# Patient Record
Sex: MALE | Race: WHITE | NOT HISPANIC OR LATINO | Employment: UNEMPLOYED | URBAN - METROPOLITAN AREA
[De-identification: names, ages, dates, MRNs, and addresses within clinical notes are randomized per-mention and may not be internally consistent; named-entity substitution may affect disease eponyms.]

---

## 2021-07-02 ENCOUNTER — TRANSFERRED RECORDS (OUTPATIENT)
Dept: HEALTH INFORMATION MANAGEMENT | Facility: CLINIC | Age: 36
End: 2021-07-02

## 2021-07-12 ENCOUNTER — TRANSFERRED RECORDS (OUTPATIENT)
Dept: HEALTH INFORMATION MANAGEMENT | Facility: CLINIC | Age: 36
End: 2021-07-12

## 2021-07-12 LAB — PHQ9 SCORE: 0

## 2021-11-12 ENCOUNTER — TRANSFERRED RECORDS (OUTPATIENT)
Dept: HEALTH INFORMATION MANAGEMENT | Facility: CLINIC | Age: 36
End: 2021-11-12

## 2022-03-03 ENCOUNTER — TRANSFERRED RECORDS (OUTPATIENT)
Dept: HEALTH INFORMATION MANAGEMENT | Facility: CLINIC | Age: 37
End: 2022-03-03

## 2022-07-20 ENCOUNTER — OFFICE VISIT (OUTPATIENT)
Dept: FAMILY MEDICINE | Facility: CLINIC | Age: 37
End: 2022-07-20
Payer: COMMERCIAL

## 2022-07-20 VITALS
SYSTOLIC BLOOD PRESSURE: 114 MMHG | BODY MASS INDEX: 35.05 KG/M2 | HEIGHT: 71 IN | DIASTOLIC BLOOD PRESSURE: 70 MMHG | WEIGHT: 250.38 LBS | HEART RATE: 83 BPM

## 2022-07-20 DIAGNOSIS — J45.30 MILD PERSISTENT ASTHMA, UNSPECIFIED WHETHER COMPLICATED: ICD-10-CM

## 2022-07-20 DIAGNOSIS — M25.531 PAIN IN BOTH WRISTS: ICD-10-CM

## 2022-07-20 DIAGNOSIS — G89.4 CHRONIC PAIN DISORDER: ICD-10-CM

## 2022-07-20 DIAGNOSIS — G89.29 CHRONIC BILATERAL LOW BACK PAIN WITH BILATERAL SCIATICA: ICD-10-CM

## 2022-07-20 DIAGNOSIS — M25.532 PAIN IN BOTH WRISTS: ICD-10-CM

## 2022-07-20 DIAGNOSIS — F90.2 ATTENTION DEFICIT HYPERACTIVITY DISORDER (ADHD), COMBINED TYPE: Primary | ICD-10-CM

## 2022-07-20 DIAGNOSIS — M54.41 CHRONIC BILATERAL LOW BACK PAIN WITH BILATERAL SCIATICA: ICD-10-CM

## 2022-07-20 DIAGNOSIS — M54.42 CHRONIC BILATERAL LOW BACK PAIN WITH BILATERAL SCIATICA: ICD-10-CM

## 2022-07-20 DIAGNOSIS — Z79.899 CONTROLLED SUBSTANCE AGREEMENT SIGNED: ICD-10-CM

## 2022-07-20 PROBLEM — F12.90 MARIJUANA USE, CONTINUOUS: Status: ACTIVE | Noted: 2021-05-04

## 2022-07-20 PROBLEM — F39 MOOD DISORDER (H): Status: ACTIVE | Noted: 2017-08-30

## 2022-07-20 PROBLEM — F39 MOOD DISORDER (H): Status: RESOLVED | Noted: 2017-08-30 | Resolved: 2022-07-20

## 2022-07-20 PROBLEM — J30.2 SEASONAL ALLERGIC RHINITIS, UNSPECIFIED TRIGGER: Status: ACTIVE | Noted: 2018-03-21

## 2022-07-20 PROBLEM — B35.3 TINEA PEDIS OF BOTH FEET: Status: ACTIVE | Noted: 2019-09-23

## 2022-07-20 PROBLEM — Z72.51 HIGH RISK SEXUAL BEHAVIOR: Status: ACTIVE | Noted: 2017-08-30

## 2022-07-20 PROBLEM — M25.50 MULTIPLE JOINT PAIN: Status: ACTIVE | Noted: 2021-05-04

## 2022-07-20 PROBLEM — B36.0 TINEA VERSICOLOR: Status: RESOLVED | Noted: 2019-09-23 | Resolved: 2022-07-20

## 2022-07-20 PROBLEM — Z72.51 HIGH RISK SEXUAL BEHAVIOR: Status: RESOLVED | Noted: 2017-08-30 | Resolved: 2022-07-20

## 2022-07-20 PROBLEM — B36.0 TINEA VERSICOLOR: Status: ACTIVE | Noted: 2019-09-23

## 2022-07-20 PROBLEM — B35.3 TINEA PEDIS OF BOTH FEET: Status: RESOLVED | Noted: 2019-09-23 | Resolved: 2022-07-20

## 2022-07-20 PROCEDURE — 99204 OFFICE O/P NEW MOD 45 MIN: CPT | Performed by: FAMILY MEDICINE

## 2022-07-20 RX ORDER — IMIQUIMOD 12.5 MG/.25G
1 CREAM TOPICAL
COMMUNITY
Start: 2021-05-19 | End: 2022-11-22

## 2022-07-20 RX ORDER — FLUTICASONE PROPIONATE AND SALMETEROL XINAFOATE 230; 21 UG/1; UG/1
AEROSOL, METERED RESPIRATORY (INHALATION)
COMMUNITY
Start: 2022-06-21 | End: 2022-07-20

## 2022-07-20 RX ORDER — DEXTROAMPHETAMINE SACCHARATE, AMPHETAMINE ASPARTATE, DEXTROAMPHETAMINE SULFATE AND AMPHETAMINE SULFATE 7.5; 7.5; 7.5; 7.5 MG/1; MG/1; MG/1; MG/1
TABLET ORAL
COMMUNITY
Start: 2021-01-19 | End: 2023-02-24

## 2022-07-20 RX ORDER — LORATADINE 10 MG/1
10 TABLET ORAL DAILY
COMMUNITY
Start: 2021-08-03

## 2022-07-20 RX ORDER — METHOCARBAMOL 750 MG/1
TABLET, FILM COATED ORAL
Qty: 60 TABLET | Refills: 1 | Status: SHIPPED | OUTPATIENT
Start: 2022-07-20 | End: 2022-09-16

## 2022-07-20 RX ORDER — METHOCARBAMOL 750 MG/1
TABLET, FILM COATED ORAL
COMMUNITY
Start: 2021-05-21 | End: 2022-07-20

## 2022-07-20 RX ORDER — ALBUTEROL SULFATE 90 UG/1
AEROSOL, METERED RESPIRATORY (INHALATION)
Qty: 18 G | Refills: 2 | Status: SHIPPED | OUTPATIENT
Start: 2022-07-20 | End: 2022-09-16

## 2022-07-20 RX ORDER — CLINDAMYCIN AND BENZOYL PEROXIDE 10; 50 MG/G; MG/G
GEL TOPICAL
COMMUNITY
Start: 2021-08-03 | End: 2022-09-16

## 2022-07-20 RX ORDER — FLUTICASONE PROPIONATE AND SALMETEROL XINAFOATE 230; 21 UG/1; UG/1
AEROSOL, METERED RESPIRATORY (INHALATION)
Qty: 36 G | Refills: 3 | Status: SHIPPED | OUTPATIENT
Start: 2022-07-20 | End: 2023-11-10

## 2022-07-20 RX ORDER — ALBUTEROL SULFATE 90 UG/1
AEROSOL, METERED RESPIRATORY (INHALATION)
COMMUNITY
Start: 2022-05-06 | End: 2022-07-20

## 2022-07-20 ASSESSMENT — ASTHMA QUESTIONNAIRES: ACT_TOTALSCORE: 21

## 2022-07-20 NOTE — PROGRESS NOTES
Assessment & Plan     Abraham new today to [facility]. Reviewed and reconciled chart.    1. Attention deficit hyperactivity disorder (ADHD), combined type  2. Controlled substance agreement signed 7/20/22  Attempting to sign BALTAZAR for prior evaluation.   If unable to find, patient will complete new evaluation, recommend mobiTeris due to access concerns locally.   CSA signed today in anticipation of initiation of stimulant. Previously responded well to short acting adderall, was taking 15mg BID.     3. Mild persistent asthma, unspecified whether complicated  - fluticasone-salmeterol (ADVAIR HFA) 230-21 MCG/ACT inhaler; Inhale 2 puffs BY MOUTH twice a day.  Dispense: 36 g; Refill: 3  - albuterol (PROAIR HFA/PROVENTIL HFA/VENTOLIN HFA) 108 (90 Base) MCG/ACT inhaler; INHALE 2 PUFFS BY MOUTH EVERY FOUR HOURS AS NEEDED  Dispense: 18 g; Refill: 2    Symptoms adequately controlled. Continue current management.     4. Chronic pain disorder  5. Chronic bilateral low back pain with bilateral sciatica  - methocarbamol (ROBAXIN) 750 MG tablet; TAKE 1 TABLET  BY MOUTH ONCE A DAY AS NEEDED. LAST REFILL, FOLLOW UP APPOINTMENT IS NEEDED  Dispense: 60 tablet; Refill: 1  - Spine  Referral; Future    Working with spine specialist for steroid injections. NSAIDS irritate stomach. Methocarbamol does help. Will continue.   Referral placed to our spine clinic as this is more convenient.     6. Pain in both wrists  - Orthopedic  Referral; Future     Wrist symptoms atypical for carpal tunnel or tendonitis. Avoiding NSAIDS due to gastritis concerns. Continue splints for immobilization. Recommend hand ortho evaluation.     Return in about 6 months (around 1/20/2023) for Physical Exam + follow up asthma.    Ny العراقي DO  [facility]    Jyoti Ramachandran is a 36 year old presenting for the following health issues:  Recheck Medication and Joint Pain      History of Present Illness  "      Reason for visit:  Wrist/joint issues  Symptom onset:  More than a month  Symptoms include:  Shooting pain from fingers to elbow, dull aches and pains throughout  Symptom intensity:  Severe  Symptom progression:  Staying the same  Had these symptoms before:  No  What makes it worse:  Not using my braces and socks on wrists  What makes it better:  Wrapping my wrists    He eats 2-3 servings of fruits and vegetables daily.He consumes 0 sweetened beverage(s) daily.He exercises with enough effort to increase his heart rate 20 to 29 minutes per day.  He exercises with enough effort to increase his heart rate 4 days per week.   He is taking medications regularly.    ADHD: Onset in childhood, tried Ritalin, had difficulty sleeping. Parents took off meds. Went back to school, was formally diagnosed again and restarted on medications. Restarted Adderall, was able to focus. Hasn't been taking for a while and thinks would be beneficial. Short acting, was taking 15mg BID.     ASTHMA: ACT 21/25  Well controlled, needs rescue during the day if forgets morning meds.     LOW BACK PAIN: steroid injections every 3-4 months - [facility]. Had previous imaging. Nerve ablation next option, wanting to avoid surgery.     JOINT PAIN: Wrist pain bilaterally, hurts to pick things up, wearing braces bilaterally.     HEALTH MAINTENANCE:   - Hep C: previously screened  - HIV: previously screened   - COVID: declines  - lipid: not fasting, will do at physical    - tetanus: had one done within the last 10 years in [place]?     Review of Systems   See HPI above.       Objective    /70 (BP Location: Left arm, Patient Position: Sitting, Cuff Size: Adult Regular)   Pulse 83   Ht 1.803 m (5' 11\")   Wt 113.6 kg (250 lb 6 oz)   BMI 34.92 kg/m    Body mass index is 34.92 kg/m .  Physical Exam   GENERAL: healthy, alert and no distress  NECK: no adenopathy, no asymmetry, masses, or scars and thyroid normal to palpation  RESP: lungs " clear to auscultation - no rales, rhonchi or wheezes  CV: regular rate and rhythm, normal S1 S2, no S3 or S4, no murmur, click or rub, no peripheral edema and peripheral pulses strong  ABDOMEN: soft, nontender, no hepatosplenomegaly, no masses and bowel sounds normal  MS: no gross musculoskeletal defects noted, no edema  PSYCH: mentation appears normal, affect normal/bright              .  ..

## 2022-07-20 NOTE — LETTER
[facility]  07/20/22  Patient: Duarte Cuellar  YOB: 1985  Medical Record Number: [id number]                                                                                  Non-Opioid Controlled Substance Agreement    This is an agreement between you and your provider regarding safe and appropriate use of controlled substances prescribed by your care team. Controlled substances are?medicines that can cause physical and mental dependence (abuse).     There are strict laws about having and using these medicines. We here at [facility] are  committed to working with you in your efforts to get better. To support you in this work, we'll help you schedule regular office appointments for medicine refills. If we must cancel or change your appointment for any reason, we'll make sure you have enough medicine to last until your next appointment.     As a Provider, I will:     Listen carefully to your concerns while treating you with respect.     Recommend a treatment plan that I believe is in your best interest and may involve therapies other than medicine.      Talk with you often about the possible benefits and the risk of harm of any medicine that we prescribe for you.    Assess the safety of this medicine and check how well it works.      Provide a plan on how to taper (discontinue or go off) using this medicine if the decision is made to stop its use.      ::  As a Patient, I understand controlled substances:       Are prescribed by my care provider to help me function or work and manage my condition(s).?    Are strong medicines and can cause serious side effects.       Need to be taken exactly as prescribed.?Combining controlled substances with certain medicines or chemicals (such as illegal drugs, alcohol, sedatives, sleeping pills, and benzodiazepines) can be dangerous or even fatal.? If I stop taking my medicines suddenly, I may have severe withdrawal symptoms.      The risks, benefits, and side effects of these medicine(s) were explained to me. I agree that:    1. I will take part in other treatments as advised by my care team. This may be psychiatry or counseling, physical therapy, behavioral therapy, group treatment or a referral to specialist.    2. I will keep all my appointments and understand this is part of the monitoring of controlled substances.?My care team may require an office visit for EVERY controlled substance refill. If I miss appointments or don t follow instructions, my care team may stop my medicine    3. I will take my medicines as prescribed. I will not change the dose or schedule unless my care team tells me to. There will be no refills if I run out early.      4. I may be asked to come to the clinic and complete a urine drug test or complete a pill count. If I don t give a urine sample or participate in a pill count, the care team may stop my medicine.    5. I will only receive controlled substance prescriptions from this clinic. If I am treated by another provider, I will tell them that I am taking controlled substances and that I have a treatment agreement with this provider. I will inform my [facility] care team within one business day if I am given a prescription for any controlled substance by another healthcare provider. My [facility] care team can contact other providers and pharmacists about my use of any medicines.    6. It is up to me to make sure that I don't run out of my medicines on weekends or holidays.?If my care team is willing to refill my prescription without a visit, I must request refills only during office hours. Refills may take up to 3 business days to process. I will use one pharmacy to fill all my controlled substance prescriptions. I will notify the clinic about any changes to my insurance or medicine availability.    7. I am responsible for my prescriptions. If the medicine/prescription is lost, stolen or  destroyed, it will not be replaced.?I also agree not to share controlled substance medicines with anyone.     8. I am aware I should not use any illegal or recreational drugs. I agree not to drink alcohol unless my care team says I can.     9. If I enroll in the [place] Medical Cannabis program, I will tell my care team before my next refill.    10. I will tell my care team right away if I become pregnant, have a new medical problem treated outside of my regular clinic, or have a change in my medicines.     11. I understand that this medicine can affect my thinking, judgment and reaction time.? Alcohol and drugs affect the brain and body, which can affect the safety of my driving. Being under the influence of alcohol or drugs can affect my decision-making, behaviors, personal safety and the safety of others. Driving while impaired (DWI) can occur if a person is driving, operating or in physical control of a car, motorcycle, boat, snowmobile, ATV, motorbike, off-road vehicle or any other motor vehicle ([place] Statute 169A.20). I understand the risk if I choose to drive or operate any vehicle or machinery.    I understand that if I do not follow any of the conditions above, my prescriptions or treatment may be stopped or changed.   I agree that my provider, clinic care team and pharmacy may work with any city, state or federal law enforcement agency that investigates the misuse, sale or other diversion of my controlled medicine. I will allow my provider to discuss my care with, or share a copy of, this agreement with any other treating provider, pharmacy or emergency room where I receive care.     I have read this agreement and have asked questions about anything I did not understand.    ________________________________________________________  Patient Signature - Duarte Cuellar     ___________________                   Date     ________________________________________________________  Provider Signature - Ny SNYDER  العراقي, DO       ___________________                   Date     ________________________________________________________  Witness Signature (required if provider not present while patient signing)          ___________________                   Date

## 2022-08-01 ENCOUNTER — TRANSFERRED RECORDS (OUTPATIENT)
Dept: HEALTH INFORMATION MANAGEMENT | Facility: CLINIC | Age: 37
End: 2022-08-01

## 2022-08-02 ENCOUNTER — OFFICE VISIT (OUTPATIENT)
Dept: PHYSICAL MEDICINE AND REHAB | Facility: CLINIC | Age: 37
End: 2022-08-02
Attending: FAMILY MEDICINE
Payer: COMMERCIAL

## 2022-08-02 VITALS
WEIGHT: 249.3 LBS | DIASTOLIC BLOOD PRESSURE: 74 MMHG | BODY MASS INDEX: 34.77 KG/M2 | SYSTOLIC BLOOD PRESSURE: 136 MMHG | HEART RATE: 80 BPM

## 2022-08-02 DIAGNOSIS — M54.42 CHRONIC BILATERAL LOW BACK PAIN WITH BILATERAL SCIATICA: Primary | ICD-10-CM

## 2022-08-02 DIAGNOSIS — M51.369 DEGENERATIVE DISC DISEASE, LUMBAR: ICD-10-CM

## 2022-08-02 DIAGNOSIS — M25.50 MULTIPLE JOINT PAIN: ICD-10-CM

## 2022-08-02 DIAGNOSIS — M54.41 CHRONIC BILATERAL LOW BACK PAIN WITH BILATERAL SCIATICA: Primary | ICD-10-CM

## 2022-08-02 DIAGNOSIS — G89.4 CHRONIC PAIN DISORDER: ICD-10-CM

## 2022-08-02 DIAGNOSIS — M47.816 LUMBAR FACET ARTHROPATHY: ICD-10-CM

## 2022-08-02 DIAGNOSIS — G89.29 CHRONIC BILATERAL LOW BACK PAIN WITH BILATERAL SCIATICA: Primary | ICD-10-CM

## 2022-08-02 PROCEDURE — 99204 OFFICE O/P NEW MOD 45 MIN: CPT | Performed by: NURSE PRACTITIONER

## 2022-08-02 ASSESSMENT — PAIN SCALES - GENERAL: PAINLEVEL: MODERATE PAIN (5)

## 2022-08-02 NOTE — LETTER
8/2/2022         RE: Duarte Cuellar  [address]  [place] [facility] [address]        Dear Colleague,    Thank you for referring your patient, Duarte Cuellar, to the [facility]. Please see a copy of my visit note below.    ASSESSMENT: Duarte Cuellar is a 36 year old male who presents for consultation at the request of PCP Ny العراقي, with a past medical history significant for mild persistent asthma, ADHD, chronic pain disorder, chronic LBP, controlled substance agreement Adderall 7/20/2022, continuous marijuana use who presents today for new patient evaluation of:    -Chronic bilateral low back pain with some increased pain with facet loading on exam indicating potential facet mediated pain.  Previous MRI from my spine also showed degenerative disc changes at both L4-5 and L5-S1.  Short-term benefit with WALE's in the past 1 year.    Patient is neurologically intact on exam. No myelopathic or red flag symptoms.     OSWESTRY DISABILITY INDEX 8/2/2022   Count 10   Sum 24   Oswestry Score (%) 48   Some recent data might be hidden            Diagnoses and all orders for this visit:  Chronic bilateral low back pain with bilateral sciatica  -     Spine  Referral  -     Physical Therapy Referral; Future  Degenerative disc disease, lumbar  -     Physical Therapy Referral; Future  Chronic pain disorder  -     Spine  Referral  -     Physical Therapy Referral; Future  Multiple joint pain  -     CK total; Future  -     CRP inflammation; Future  -     Erythrocyte sedimentation rate auto; Future  -     Rheumatoid factor; Future  Lumbar facet arthropathy  -     Physical Therapy Referral; Future    PLAN:  Reviewed spine anatomy and disease process. Discussed diagnosis and treatment options with the patient today. A shared decision making model was used.  The patient's values and choices were respected. The following represents what was discussed and decided upon  by the provider and the patient.      -DIAGNOSTIC TESTS:    --Consider lumbar spine flexion-extension x-ray down the road if symptoms or not improving.  No spondylolisthesis noted on previous reports however.  -- Lumbar spine MRI by [facility] report 7/2/2021 with moderate disc height loss at L5-S1 left greater than right with mild foraminal stenosis.  Moderate disc degeneration L4-5 right greater than left with RIGHT disc protrusion with mild foraminal stenosis.  Requesting images    -PHYSICAL THERAPY: Referral to [facility] for intensive core strengthening for long-term spine health as well as to establish home exercises ongoing.  Discussed the importance of core strengthening, ROM, stretching exercises with the patient and how each of these entities is important in decreasing pain.  Explained to the patient that the purpose of physical therapy is to teach the patient a home exercise program.  These exercises need to be performed every day in order to decrease pain and prevent future occurrences of pain.        -MEDICATIONS: No changes in medications today  Discussed multiple medication options today with patient. Discussed risks, side effects, and proper use of medications. Patient verbalized understanding.    -INTERVENTIONS: Requested prior WALE's for my spine to know which level was completed.  We could consider either a different lateral WALE versus bilateral L3, L4, L5 MBB pending review all of images and review of prior injection trials at [facility].  Discussed risks and benefits of injections with patient today.    -PATIENT EDUCATION:  Total time of 46 minutes, on the day of service, spent with the patient, reviewing the chart, placing orders, and documenting.   -Today we also discussed the issues related to the current COVID-19 pandemic, the pros and cons of the current treatment plan, the CDC guidelines such as social distancing, washing hands, masking, and covering the cough.    -FOLLOW-UP:   Follow-up either  in clinic or for nurse navigator phone call once we review images from [facility]    Advised patient to call the Spine Center if symptoms worsen or you have problems controlling bladder and bowel function.   ______________________________________________________________________    SUBJECTIVE:  HPI:  Duarte Cuellar  Is a 36 year old male who presents today for new patient evaluation of low back pain midline and bilateral at the belt line and lumbosacral junction ongoing for many years after lifting wrong 15 years ago.  Patient reports that historically its been more episodic but recently has been more constant aching 4/10 currently up to a 10 at its worst, 2 at its best.  He does report that he is a lot increased pain and stiffness first thing in the morning as well as with generalized activity.  Patient currently denies any radiating lower extremity pain but he does report that he is had sciatica in the past.  Denies any recent trips or falls or balance changes, denies bowel or bladder loss control.    -Treatment to Date: No prior spinal surgery  Physical therapy in the past, does continue with home exercises    -[facility] epidural steroid injections every 3 months since 2021, awaiting WALE levels.    -Medications:  Methocarbamol    *NSAIDs with stomach irritation    Current Outpatient Medications   Medication     methocarbamol (ROBAXIN) 750 MG tablet     albuterol (PROAIR HFA/PROVENTIL HFA/VENTOLIN HFA) 108 (90 Base) MCG/ACT inhaler     amphetamine-dextroamphetamine (ADDERALL) 30 MG tablet     clindamycin-benzoyl peroxide (BENZACLIN) 1-5 % external gel     fluticasone-salmeterol (ADVAIR HFA) 230-21 MCG/ACT inhaler     imiquimod (ALDARA) 5 % external cream     loratadine (CLARITIN) 10 MG tablet     No current facility-administered medications for this visit.       No Known Allergies    Past Medical History:   Diagnosis Date     Mood disorder (H) 8/30/2017     Tinea pedis of both feet 9/23/2019     Tinea versicolor  9/23/2019        Patient Active Problem List   Diagnosis     Seasonal allergic rhinitis, unspecified trigger     Multiple joint pain     Mild persistent asthma, unspecified whether complicated     Marijuana use, continuous     Chronic pain disorder     Chronic bilateral low back pain with bilateral sciatica     Attention deficit hyperactivity disorder (ADHD), combined type     Controlled substance agreement signed 7/20/22       No past surgical history on file.    Family History   Problem Relation Age of Onset     Arthritis Mother      Obesity Mother      Diabetes Mother      Prostate Cancer Father        Reviewed past medical, surgical, and family history with patient found on new patient intake packet located in [employer]MR [employer] tab.     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  Patient denies smoking/tobacco use.  Does report drinking alcohol once or twice a week.  Denies history of being a heavy drinker, does report ongoing cannabis use.    ROS: Positive for joint pain, abdominal pain, reflux.  Specifically negative for bowel/bladder dysfunction, balance changes, headache, dizziness, foot drop, fevers, chills, appetite changes, nausea/vomiting, unexplained weight loss. Otherwise 13 systems reviewed are negative. Please see the patient's intake questionnaire from today for details.    OBJECTIVE:  /74 (BP Location: Left arm, Patient Position: Sitting, Cuff Size: Adult Regular)   Pulse 80   Wt 249 lb 4.8 oz (113.1 kg)   BMI 34.77 kg/m      PHYSICAL EXAMINATION:    --CONSTITUTIONAL:  Vital signs as above.  No acute distress.  The patient is well nourished and well groomed.  --PSYCHIATRIC:  Appropriate mood and affect. The patient is awake, alert, oriented to person, place, time and answering questions appropriately with clear speech.    --SKIN:  Skin over the face, bilateral lower extremities, and posterior torso is clean, dry, intact without rashes.    --RESPIRATORY: Normal rhythm and effort.  No abnormal accessory muscle breathing patterns noted.   --ABDOMINAL:  Non-distended.  --STANDING EXAMINATION:  Normal lumbar lordosis noted, no lateral shift.  --MUSCULOSKELETAL: Lumbar spine inspection reveals no evidence of deformity. Range of motion is not limited in lumbar flexion, extension, lateral rotation. No tenderness to palpation lumbar spine. Straight leg raising in the supine position is negative to radicular pain. Sciatic notch non-tender.  --GROSS MOTOR: Gait is non-antalgic. Easily arises from a seated position.   --LOWER EXTREMITY MOTOR TESTING:  Plantar flexion left 5/5, right 5/5   Dorsiflexion left 5/5, right 5/5   Great toe MTP extension left 5/5, right 5/5  Knee flexion left 5/5, right 5/5  Knee extension left 5/5, right 5/5   Hip flexion left 5/5, right 5/5  Hip abduction left 5/5, right 5/5  Hip adduction left 5/5, right 5/5   --HIPS: Full range of motion bilaterally.   --NEUROLOGICAL:  2/4 patellar, medial hamstring, and achilles reflexes bilaterally.  Sensation to light touch is intact in the bilateral L4, L5, and S1 dermatomes. Babinski is negative. No clonus.  Negative Rain reflex bilaterally.  --VASCULAR:  2/4 dorsalis pedis and posterior tibialsi pulses bilaterally.  Bilateral lower extremities are warm.  There is no pitting edema of the bilateral lower extremities.    RESULTS: Prior medical records from [facility] and [facility] were reviewed today.                 Again, thank you for allowing me to participate in the care of your patient.        Sincerely,        Apoorva Sparks, CNP

## 2022-08-02 NOTE — PATIENT INSTRUCTIONS
~Please call our [facility] Nurse Navigation line [phone] with any questions or concerns about your treatment plan, if symptoms worsen and you would like to be seen urgently, or if you have problems controlling bladder and bowel function.       LABS (blood draw) have been ordered today to further evaluate your health.   You can get thesecompleted at the [facility] clinics or hospitals.        ~You have been referred for Physical Therapy to [facility]. They will call you to schedule an appointment.      Scheduling phone number is [phone] for [facility], or [place] location.  If you have not heard from the scheduling office within 2 business days, please call [phone] for ALL other locations.    Discussed the importance of core strengthening, ROM, stretching exercises and how each of these entities is important in decreasing pain and improving long term spine health.  The purpose of physical therapy is to teach you an individualized home exercise program.  These exercises need to be performed every day in order to decrease pain and prevent future occurrences of pain.

## 2022-08-02 NOTE — PROGRESS NOTES
ASSESSMENT: Duarte Cuellar is a 36 year old male who presents for consultation at the request of PCP Ny العراقي, with a past medical history significant for mild persistent asthma, ADHD, chronic pain disorder, chronic LBP, controlled substance agreement Adderall 7/20/2022, continuous marijuana use who presents today for new patient evaluation of:    -Chronic bilateral low back pain with some increased pain with facet loading on exam indicating potential facet mediated pain.  Previous MRI from my spine also showed degenerative disc changes at both L4-5 and L5-S1.  Short-term benefit with WALE's in the past 1 year.    Patient is neurologically intact on exam. No myelopathic or red flag symptoms.     OSWESTRY DISABILITY INDEX 8/2/2022   Count 10   Sum 24   Oswestry Score (%) 48   Some recent data might be hidden            Diagnoses and all orders for this visit:  Chronic bilateral low back pain with bilateral sciatica  -     Spine  Referral  -     Physical Therapy Referral; Future  Degenerative disc disease, lumbar  -     Physical Therapy Referral; Future  Chronic pain disorder  -     Spine  Referral  -     Physical Therapy Referral; Future  Multiple joint pain  -     CK total; Future  -     CRP inflammation; Future  -     Erythrocyte sedimentation rate auto; Future  -     Rheumatoid factor; Future  Lumbar facet arthropathy  -     Physical Therapy Referral; Future    PLAN:  Reviewed spine anatomy and disease process. Discussed diagnosis and treatment options with the patient today. A shared decision making model was used.  The patient's values and choices were respected. The following represents what was discussed and decided upon by the provider and the patient.      -DIAGNOSTIC TESTS:    --Consider lumbar spine flexion-extension x-ray down the road if symptoms or not improving.  No spondylolisthesis noted on previous reports however.  -- Lumbar spine MRI by [facility] report 7/2/2021 with  moderate disc height loss at L5-S1 left greater than right with mild foraminal stenosis.  Moderate disc degeneration L4-5 right greater than left with RIGHT disc protrusion with mild foraminal stenosis.  Requesting images    -PHYSICAL THERAPY: Referral to [facility] program for intensive core strengthening for long-term spine health as well as to establish home exercises ongoing.  Discussed the importance of core strengthening, ROM, stretching exercises with the patient and how each of these entities is important in decreasing pain.  Explained to the patient that the purpose of physical therapy is to teach the patient a home exercise program.  These exercises need to be performed every day in order to decrease pain and prevent future occurrences of pain.        -MEDICATIONS: No changes in medications today  Discussed multiple medication options today with patient. Discussed risks, side effects, and proper use of medications. Patient verbalized understanding.    -INTERVENTIONS: Requested prior WALE's for my spine to know which level was completed.  We could consider either a different lateral WALE versus bilateral L3, L4, L5 MBB pending review all of images and review of prior injection trials at [facility], [place].  Discussed risks and benefits of injections with patient today.    -PATIENT EDUCATION:  Total time of 46 minutes, on the day of service, spent with the patient, reviewing the chart, placing orders, and documenting.   -Today we also discussed the issues related to the current COVID-19 pandemic, the pros and cons of the current treatment plan, the CDC guidelines such as social distancing, washing hands, masking, and covering the cough.    -FOLLOW-UP:   Follow-up either in clinic or for nurse navigator phone call once we review images from [facility], [place]    Advised patient to call the Spine Center if symptoms worsen or you have problems controlling bladder and bowel function.    ______________________________________________________________________    SUBJECTIVE:  HPI:  Duarte Cuellar  Is a 36 year old male who presents today for new patient evaluation of low back pain midline and bilateral at the belt line and lumbosacral junction ongoing for many years after lifting wrong 15 years ago.  Patient reports that historically its been more episodic but recently has been more constant aching 4/10 currently up to a 10 at its worst, 2 at its best.  He does report that he is a lot increased pain and stiffness first thing in the morning as well as with generalized activity.  Patient currently denies any radiating lower extremity pain but he does report that he is had sciatica in the past.  Denies any recent trips or falls or balance changes, denies bowel or bladder loss control.    -Treatment to Date: No prior spinal surgery  Physical therapy in the past, does continue with home exercises    Addendum: Review of records from [facility] 8/16/2022:   Bilateral L5-S1 TFESI 7/15/2021  Bilateral L5-S1 TFESI 11/12/2021  Bilateral L5-S1 TFESI 3/3/2022    -Medications:  Methocarbamol    *NSAIDs with stomach irritation    Current Outpatient Medications   Medication     methocarbamol (ROBAXIN) 750 MG tablet     albuterol (PROAIR HFA/PROVENTIL HFA/VENTOLIN HFA) 108 (90 Base) MCG/ACT inhaler     amphetamine-dextroamphetamine (ADDERALL) 30 MG tablet     clindamycin-benzoyl peroxide (BENZACLIN) 1-5 % external gel     fluticasone-salmeterol (ADVAIR HFA) 230-21 MCG/ACT inhaler     imiquimod (ALDARA) 5 % external cream     loratadine (CLARITIN) 10 MG tablet     No current facility-administered medications for this visit.       No Known Allergies    Past Medical History:   Diagnosis Date     Mood disorder (H) 8/30/2017     Tinea pedis of both feet 9/23/2019     Tinea versicolor 9/23/2019        Patient Active Problem List   Diagnosis     Seasonal allergic rhinitis, unspecified trigger     Multiple joint pain      Mild persistent asthma, unspecified whether complicated     Marijuana use, continuous     Chronic pain disorder     Chronic bilateral low back pain with bilateral sciatica     Attention deficit hyperactivity disorder (ADHD), combined type     Controlled substance agreement signed 7/20/22       No past surgical history on file.    Family History   Problem Relation Age of Onset     Arthritis Mother      Obesity Mother      Diabetes Mother      Prostate Cancer Father        Reviewed past medical, surgical, and family history with patient found on new patient intake packet located in EMR [employer] tab.     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  Patient denies smoking/tobacco use.  Does report drinking alcohol once or twice a week.  Denies history of being a heavy drinker, does report ongoing cannabis use.    ROS: Positive for joint pain, abdominal pain, reflux.  Specifically negative for bowel/bladder dysfunction, balance changes, headache, dizziness, foot drop, fevers, chills, appetite changes, nausea/vomiting, unexplained weight loss. Otherwise 13 systems reviewed are negative. Please see the patient's intake questionnaire from today for details.    OBJECTIVE:  /74 (BP Location: Left arm, Patient Position: Sitting, Cuff Size: Adult Regular)   Pulse 80   Wt 249 lb 4.8 oz (113.1 kg)   BMI 34.77 kg/m      PHYSICAL EXAMINATION:    --CONSTITUTIONAL:  Vital signs as above.  No acute distress.  The patient is well nourished and well groomed.  --PSYCHIATRIC:  Appropriate mood and affect. The patient is awake, alert, oriented to person, place, time and answering questions appropriately with clear speech.    --SKIN:  Skin over the face, bilateral lower extremities, and posterior torso is clean, dry, intact without rashes.    --RESPIRATORY: Normal rhythm and effort. No abnormal accessory muscle breathing patterns noted.   --ABDOMINAL:  Non-distended.  --STANDING EXAMINATION:  Normal lumbar lordosis  noted, no lateral shift.  --MUSCULOSKELETAL: Lumbar spine inspection reveals no evidence of deformity. Range of motion is not limited in lumbar flexion, extension, lateral rotation. No tenderness to palpation lumbar spine. Straight leg raising in the supine position is negative to radicular pain. Sciatic notch non-tender.  --GROSS MOTOR: Gait is non-antalgic. Easily arises from a seated position.   --LOWER EXTREMITY MOTOR TESTING:  Plantar flexion left 5/5, right 5/5   Dorsiflexion left 5/5, right 5/5   Great toe MTP extension left 5/5, right 5/5  Knee flexion left 5/5, right 5/5  Knee extension left 5/5, right 5/5   Hip flexion left 5/5, right 5/5  Hip abduction left 5/5, right 5/5  Hip adduction left 5/5, right 5/5   --HIPS: Full range of motion bilaterally.   --NEUROLOGICAL:  2/4 patellar, medial hamstring, and achilles reflexes bilaterally.  Sensation to light touch is intact in the bilateral L4, L5, and S1 dermatomes. Babinski is negative. No clonus.  Negative Rain reflex bilaterally.  --VASCULAR:  2/4 dorsalis pedis and posterior tibialsi pulses bilaterally.  Bilateral lower extremities are warm.  There is no pitting edema of the bilateral lower extremities.    RESULTS: Prior medical records from [facility] and [facility] Everywhere were reviewed today.

## 2022-08-22 ENCOUNTER — HOSPITAL ENCOUNTER (OUTPATIENT)
Dept: PHYSICAL THERAPY | Facility: CLINIC | Age: 37
Discharge: HOME | End: 2022-08-22
Payer: COMMERCIAL

## 2022-08-22 DIAGNOSIS — M54.41 CHRONIC BILATERAL LOW BACK PAIN WITH BILATERAL SCIATICA: ICD-10-CM

## 2022-08-22 DIAGNOSIS — M51.369 DEGENERATIVE DISC DISEASE, LUMBAR: ICD-10-CM

## 2022-08-22 DIAGNOSIS — M54.42 CHRONIC BILATERAL LOW BACK PAIN WITH BILATERAL SCIATICA: ICD-10-CM

## 2022-08-22 DIAGNOSIS — G89.4 CHRONIC PAIN DISORDER: ICD-10-CM

## 2022-08-22 DIAGNOSIS — M47.816 LUMBAR FACET ARTHROPATHY: ICD-10-CM

## 2022-08-22 DIAGNOSIS — G89.29 CHRONIC BILATERAL LOW BACK PAIN WITH BILATERAL SCIATICA: ICD-10-CM

## 2022-08-22 PROCEDURE — 97161 PT EVAL LOW COMPLEX 20 MIN: CPT | Mod: GP

## 2022-08-22 PROCEDURE — 97110 THERAPEUTIC EXERCISES: CPT | Mod: GP

## 2022-08-22 NOTE — PROGRESS NOTES
Lumbar MedX Initial testing    AROM (full=  0-72  lumbar) 0-42   Max Extension Torque  282   Flex: ext ratio (ideal 1.4:1) 2.01:1       Cervical MedX Initial testing   AROM (full= 0-120  cervical)    Max Extension Torque     Flex: ext ratio (ideal 1.4:1)      Date 8/22/22   Lumbar Parameters    Top Dead Center (TDC) 21   Counterbalance (CB) 410   Seat Pad 0   Femur Restraint 5   Week/Visit    Enter Week/Visit # W1V1   Weight (lbs) 125# (ex)   Reps (#) 15   Time 90s   ROM (degrees) 0-42   Pain slight   Flex:Ext ratio 2.01:1   Cervical Parameters    Top Dead Center (TDC)    Counterbalance (CB)    Seat Height    Week/Visit    Enter Week/Visit #    Weight (lbs)    Reps (#)    Time    ROM (degrees)    Pain    Flex:Ext ratio      Date 8/22/22   Exercise    Supine PPTs X 10, 5 sec holds   Quadruped birddog X 10 B   Lateral band walks X 10 B green band                                         Scott Parmer, PT, DPT

## 2022-08-23 NOTE — PROGRESS NOTES
[facility]    OUTPATIENT PHYSICAL THERAPY ORTHOPEDIC EVALUATION  PLAN OF TREATMENT FOR OUTPATIENT REHABILITATION  (COMPLETE FOR INITIAL CLAIMS ONLY)  Patient's Last Name, First Name, M.I.  YOB: 1985  Duarte Cuellar    Provider s Name:  [facility]   Medical Record No.  [id number]   Start of Care Date:  08/22/22   Onset Date:  08/02/22   Type:     _X__PT   ___OT   ___SLP Medical Diagnosis:  G89.4 (ICD-10-CM) - Chronic pain disorder M54.42, M54.41, G89.29 (ICD-10-CM) - Chronic bilateral low back pain with bilateral sciatica     PT Diagnosis:  B low back pain; core and hip weakness   Visits from SOC:  1      _________________________________________________________________________________  Plan of Treatment/Functional Goals:  ADL retraining, balance training, gait training, joint mobilization, manual therapy, neuromuscular re-education, ROM, strengthening, stretching     Biofeedback, Cryotherapy, Electrical stimulation, Hot packs, TENS, Traction, Ultrasound     Goals  Goal Identifier: STEVIE  Goal Description: Patient will demonstrate at least 10% improvement on the STEVIE to show improvement in function.  Target Date: 10/31/22    Goal Identifier: Lifting  Goal Description: Patient will demonstrate ability to lift at least 25# with no pain to improve ability to do house and yard work.  Target Date: 10/31/22    Goal Identifier: Walking  Goal Description: Patient will improve walking tolerance to at least 45 minutes with no pain to return to PLOF.  Target Date: 11/01/22      Therapy Frequency:  2 times/Week  Predicted Duration of Therapy Intervention:  8 weeks    Scott Parmer, PT                 I CERTIFY THE NEED FOR THESE SERVICES FURNISHED UNDER        THIS PLAN OF TREATMENT AND WHILE UNDER MY CARE     (Physician co-signature of this document indicates review and  "certification of the therapy plan).                     Certification Date From:  08/22/22   Certification Date To:  11/01/22    Referring Provider:  Apoorva Sparks CNP    Initial Assessment        See Epic Evaluation Start of Care Date: 08/22/22 08/22/22 1300   General Information   Type of Visit Initial OP Ortho PT Evaluation   Start of Care Date 08/22/22   Referring Physician Apoorva Sparks CNP   Orders Evaluate and Treat   Date of Order 08/02/22   Certification Required? Yes   Medical Diagnosis G89.4 (ICD-10-CM) - Chronic pain disorder M54.42, M54.41, G89.29 (ICD-10-CM) - Chronic bilateral low back pain with bilateral sciatica   Surgical/Medical history reviewed Yes   Precautions/Limitations no known precautions/limitations   Body Part(s)   Body Part(s) Hip;Lumbar Spine/SI   Presentation and Etiology   Pertinent history of current problem (include personal factors and/or comorbidities that impact the POC) Patient reports chronic low back pain which has been going on for over a decade. Functionally, he has most difficulty with bending, lifting, reaching, He has most difficulty with any prolonged activity - he notes that he frequently \"over-does\" it. His primary goal is to get back to weight lifting, including dead lifting and squatting. He does have a hx of sciatic pain, not much as of recent. He mostly describes this pain as going into the glute. He does work on stretching which seems to help intermittently. Currently rates pain at a 4/10.   Impairments D. Decreased ROM;E. Decreased flexibility;F. Decreased strength and endurance;A. Pain   Functional Limitations perform activities of daily living;perform desired leisure / sports activities   Symptom Location Low back   How/Where did it occur With repetition/overuse   Onset date of current episode/exacerbation 08/02/22   Chronicity Chronic   Pain rating (0-10 point scale) Best (/10);Worst (/10)   Best (/10) 2   Worst (/10) 10   Pain quality " B. Dull;C. Aching;E. Shooting   Frequency of pain/symptoms A. Constant   Fall Risk Screen   Fall screen completed by PT   Have you fallen 2 or more times in the past year? No   Have you fallen and had an injury in the past year? No   Is patient a fall risk? No   Abuse Screen (yes response referral indicated)   Feels Unsafe at Home or Work/School no   Feels Threatened by Someone no   Does Anyone Try to Keep You From Having Contact with Others or Doing Things Outside Your Home? no   Physical Signs of Abuse Present no   System Outcome Measures   Outcome Measures Low Back Pain (see Oswestry and Rocky)  (62%)   Lumbar Spine/SI Objective Findings   Gait/Locomotion Toe out B   Balance/Proprioception (Single Leg Stance) Normal no pain   Hamstring Flexibility 90 deg   Hip Flexor Flexibility Limited R   Piriformis Flexibility Limited R   Flexion ROM Palms to floor no pain   Extension ROM min loss with stiffness   Right Side Bending ROM Fingertip to knee no pain   Left Side Bending ROM fingertip to knee no pain   Pelvic Screen Anteriorly rotated L innominate, corrected with MET   Hip Screen WNL hip PROM B   Hip Flexion (L2) Strength 5/5   Hip Abduction Strength 4/5 R; 5/5 L   Hip Extension Strength 5/5   Knee Flexion Strength 5/5   Knee Extension (L3) Strength 5/5   Ankle Dorsiflexion (L4) Strength 5/5   Great Toe Extension (L5) Strength 5/5   Ankle Plantar Flexion (S1) Strength 5/5   SLR 80 deg L; 72 deg R   Crossover SLR neg   Segmental Mobility slight hypomobility through lumbar spine, tenderness L4-S1   Palpation Tender lower lumbar paraspinals B; QL tightness B   Slump Test pos R   Posture Mild anterior pelvic tilt   Planned Therapy Interventions   Planned Therapy Interventions ADL retraining;balance training;gait training;joint mobilization;manual therapy;neuromuscular re-education;ROM;strengthening;stretching   Planned Modality Interventions   Planned Modality Interventions Biofeedback;Cryotherapy;Electrical  stimulation;Hot packs;TENS;Traction;Ultrasound   Clinical Impression   Criteria for Skilled Therapeutic Interventions Met yes, treatment indicated   PT Diagnosis B low back pain; core and hip weakness   Functional limitations due to impairments bending, twisting, lifting, prolonged positioning, walking   Clinical Presentation Stable/Uncomplicated   Clinical Decision Making (Complexity) Low complexity   Therapy Frequency 2 times/Week   Predicted Duration of Therapy Intervention (days/wks) 8 weeks   Risk & Benefits of therapy have been explained Yes   Patient, Family & other staff in agreement with plan of care Yes   Clinical Impression Comments Patient is a 36 year old male presenting to physical therapy with B low back pain which occasionally radiates into the LE. On exam, he demonstrates hip weakness and poor core control. Functionally, he has most difficulty with prolonged positioning, walking, and lifting. He would benefit from skilled PT services to address limitations.   ORTHO GOALS   PT Ortho Eval Goals 1;2;3;4   Ortho Goal 1   Goal Identifier STEVIE   Goal Description Patient will demonstrate at least 10% improvement on the STEVIE to show improvement in function.   Target Date 10/31/22   Ortho Goal 2   Goal Identifier Lifting   Goal Description Patient will demonstrate ability to lift at least 25# with no pain to improve ability to do house and yard work.   Target Date 10/31/22   Ortho Goal 3   Goal Identifier Walking   Goal Description Patient will improve walking tolerance to at least 45 minutes with no pain to return to PLOF.   Target Date 11/01/22   Total Evaluation Time   PT Eval, Low Complexity Minutes (49960) 25   Therapy Certification   Certification date from 08/22/22   Certification date to 11/01/22   Medical Diagnosis G89.4 (ICD-10-CM) - Chronic pain disorder M54.42, M54.41, G89.29 (ICD-10-CM) - Chronic bilateral low back pain with bilateral sciatica     Scott Parmer, PT, DPT

## 2022-08-24 ENCOUNTER — HOSPITAL ENCOUNTER (OUTPATIENT)
Dept: PHYSICAL THERAPY | Facility: CLINIC | Age: 37
Discharge: HOME | End: 2022-08-24
Payer: COMMERCIAL

## 2022-08-24 DIAGNOSIS — G89.4 CHRONIC PAIN DISORDER: Primary | ICD-10-CM

## 2022-08-24 DIAGNOSIS — M54.42 CHRONIC BILATERAL LOW BACK PAIN WITH BILATERAL SCIATICA: ICD-10-CM

## 2022-08-24 DIAGNOSIS — M54.41 CHRONIC BILATERAL LOW BACK PAIN WITH BILATERAL SCIATICA: ICD-10-CM

## 2022-08-24 DIAGNOSIS — M47.816 LUMBAR FACET ARTHROPATHY: ICD-10-CM

## 2022-08-24 DIAGNOSIS — M51.369 DEGENERATIVE DISC DISEASE, LUMBAR: ICD-10-CM

## 2022-08-24 DIAGNOSIS — G89.29 CHRONIC BILATERAL LOW BACK PAIN WITH BILATERAL SCIATICA: ICD-10-CM

## 2022-08-24 PROCEDURE — 97110 THERAPEUTIC EXERCISES: CPT | Mod: GP | Performed by: PHYSICAL THERAPIST

## 2022-09-01 ENCOUNTER — HOSPITAL ENCOUNTER (OUTPATIENT)
Dept: PHYSICAL THERAPY | Facility: CLINIC | Age: 37
Discharge: HOME | End: 2022-09-01
Attending: NURSE PRACTITIONER
Payer: COMMERCIAL

## 2022-09-01 DIAGNOSIS — G89.4 CHRONIC PAIN DISORDER: Primary | ICD-10-CM

## 2022-09-01 DIAGNOSIS — M54.41 CHRONIC BILATERAL LOW BACK PAIN WITH BILATERAL SCIATICA: ICD-10-CM

## 2022-09-01 DIAGNOSIS — M47.816 LUMBAR FACET ARTHROPATHY: ICD-10-CM

## 2022-09-01 DIAGNOSIS — M54.42 CHRONIC BILATERAL LOW BACK PAIN WITH BILATERAL SCIATICA: ICD-10-CM

## 2022-09-01 DIAGNOSIS — G89.29 CHRONIC BILATERAL LOW BACK PAIN WITH BILATERAL SCIATICA: ICD-10-CM

## 2022-09-01 DIAGNOSIS — M51.369 DEGENERATIVE DISC DISEASE, LUMBAR: ICD-10-CM

## 2022-09-01 PROCEDURE — 97140 MANUAL THERAPY 1/> REGIONS: CPT | Mod: GP

## 2022-09-01 PROCEDURE — 97110 THERAPEUTIC EXERCISES: CPT | Mod: GP

## 2022-09-01 NOTE — PROGRESS NOTES
Subjective:  15 min late today. Pt is a bit sore. Dealt with lots of sciatic pain yesterday. Went down to the hamstring area. Feeling a little better today. He has been doing lots of yard and house work this week getting ready for a party at his house this weekend.     Assessment:  Patient seen for 2nd f/u session low back pain. He did well with Medx DE today, as well as initiation of rotary torso. Progressed HEP with core strengthening, cues to keep neutral spine. Patient likely will benefit from re-establishing routine to keep spine healthy, as he was doing well in past while doing this. Has been a bit sore with increased activity this week, addressed today with manual therapy.     Lumbar MedX Initial testing    AROM (full=  0-72  lumbar) 0-42   Max Extension Torque  282   Flex: ext ratio (ideal 1.4:1) 2.01:1     Date 9/1/2022 8/24/2022 8/22/22   Lumbar Parameters      Top Dead Center (TDC) 21 21 21   Counterbalance (CB) 410 410 410   Seat Pad 0 0 0   Femur Restraint 5 5 5   Week/Visit      Enter Week/Visit # Wk 2 V 1 Wk 1 V 2 W1V1   Weight (lbs) 130# 127# 125# (ex)   Reps (#)  20 15   Time  112 s 90s   ROM (degrees) 0-42 0-42 0-42   Pain   slight   Flex:Ext ratio   2.01:1     Date 9/1/2022 8/24/2022 8/22/22   Exercise      Treadmill  X 5 min X 5 min X 5 min   Rotary Torso 90 seconds 30# to R     Supine PPTs   X 10, 5 sec holds   Quadruped birddog   X 10 B   Lateral band walks   X 10 B green band   Single leg bridge  X 10 with 5 second holds    Supine 9090 double leg march (reverse crunch)  X 10 cues for neutral spine    Cat cow   X 5 3-5 second holds    maris pose  X 5 rocking  3-5 second holds    Prone Extensions  X 3 5 second holds                                Scott Parmer, PT, DPT

## 2022-09-07 ENCOUNTER — HOSPITAL ENCOUNTER (OUTPATIENT)
Dept: PHYSICAL THERAPY | Facility: CLINIC | Age: 37
Discharge: HOME | End: 2022-09-07
Payer: COMMERCIAL

## 2022-09-07 DIAGNOSIS — M51.369 DEGENERATIVE DISC DISEASE, LUMBAR: ICD-10-CM

## 2022-09-07 DIAGNOSIS — G89.29 CHRONIC BILATERAL LOW BACK PAIN WITH BILATERAL SCIATICA: ICD-10-CM

## 2022-09-07 DIAGNOSIS — G89.4 CHRONIC PAIN DISORDER: Primary | ICD-10-CM

## 2022-09-07 DIAGNOSIS — M54.41 CHRONIC BILATERAL LOW BACK PAIN WITH BILATERAL SCIATICA: ICD-10-CM

## 2022-09-07 DIAGNOSIS — M54.42 CHRONIC BILATERAL LOW BACK PAIN WITH BILATERAL SCIATICA: ICD-10-CM

## 2022-09-07 DIAGNOSIS — M47.816 LUMBAR FACET ARTHROPATHY: ICD-10-CM

## 2022-09-07 PROCEDURE — 97110 THERAPEUTIC EXERCISES: CPT | Mod: GP | Performed by: PHYSICAL THERAPIST

## 2022-09-07 PROCEDURE — 97140 MANUAL THERAPY 1/> REGIONS: CPT | Mod: GP | Performed by: PHYSICAL THERAPIST

## 2022-09-07 NOTE — PROGRESS NOTES
Subjective:  Had a large party over the weekend, kids are back at school. Back is doing okay today, the average 3-4. Hoping having the kids back at school will help.    1Assessment:  Patient seen for 3rd f/u session low back pain. He is showing good tolerance to Medx equipment, will plan to continue to increased load to tolerance. We did continue manual therapy today, which patient found beneficial, encouraged patient to be diligent with stretching and exercises to get additional carryover benefit from manual therapy.    Lumbar MedX Initial testing    AROM (full=  0-72  lumbar) 0-42   Max Extension Torque  282   Flex: ext ratio (ideal 1.4:1) 2.01:1     Date 9/7/2022 9/1/2022 8/24/2022 8/22/22   Lumbar Parameters       Top Dead Center (TDC) 21 21 21 21   Counterbalance (CB) 410 410 410 410   Seat Pad 0 0 0 0   Femur Restraint 5 5 5 5   Week/Visit       Enter Week/Visit # Wk 2 V 2 Wk 2 V 1 Wk 1 V 2 W1V1   Weight (lbs) 133# 130# 127# 125# (ex)   Reps (#) 25  20 15   Time 100  112 s 90s   ROM (degrees) 0-42 0-42 0-42 0-42   Pain    slight   Flex:Ext ratio    2.01:1     Date 9/7/2022 9/7/2022 9/1/2022 8/24/2022 8/22/22   Exercise        Treadmill  X 5 min X 5 min  X 5 min X 5 min X 5 min   Rotary Torso 90 seconds 32#'s to R 32#'s to L 30# to R     Supine PPTs     X 10, 5 sec holds   Quadruped birddog     X 10 B   Lateral band walks     X 10 B green band   Single leg bridge    X 10 with 5 second holds    Supine 9090 double leg march (reverse crunch)    X 10 cues for neutral spine    Cat cow     X 5 3-5 second holds    maris pose    X 5 rocking  3-5 second holds    Prone Extensions    X 3 5 second holds                                        Layo Leonard, PT

## 2022-09-09 ENCOUNTER — HOSPITAL ENCOUNTER (OUTPATIENT)
Dept: PHYSICAL THERAPY | Facility: CLINIC | Age: 37
Discharge: HOME | End: 2022-09-09
Payer: COMMERCIAL

## 2022-09-09 DIAGNOSIS — M54.42 CHRONIC BILATERAL LOW BACK PAIN WITH BILATERAL SCIATICA: ICD-10-CM

## 2022-09-09 DIAGNOSIS — G89.4 CHRONIC PAIN DISORDER: Primary | ICD-10-CM

## 2022-09-09 DIAGNOSIS — M54.41 CHRONIC BILATERAL LOW BACK PAIN WITH BILATERAL SCIATICA: ICD-10-CM

## 2022-09-09 DIAGNOSIS — G89.29 CHRONIC BILATERAL LOW BACK PAIN WITH BILATERAL SCIATICA: ICD-10-CM

## 2022-09-09 DIAGNOSIS — M47.816 LUMBAR FACET ARTHROPATHY: ICD-10-CM

## 2022-09-09 DIAGNOSIS — M51.369 DEGENERATIVE DISC DISEASE, LUMBAR: ICD-10-CM

## 2022-09-09 PROCEDURE — 97140 MANUAL THERAPY 1/> REGIONS: CPT | Mod: GP | Performed by: PHYSICAL THERAPIST

## 2022-09-09 PROCEDURE — 97110 THERAPEUTIC EXERCISES: CPT | Mod: GP | Performed by: PHYSICAL THERAPIST

## 2022-09-09 NOTE — PROGRESS NOTES
Subjective:  Had EMG and injection in wrists yesterday. Some pain is still, but the goal is to get strong.    Assessment:  Patient seen for 4th f/u session low back pain. He is showing good tolerance to Medx equipment, will plan to continue to increased load to tolerance. We did continue manual therapy today, which patient found beneficial, encouraged patient to be diligent with stretching and exercises to get additional carryover benefit from manual therapy.    Lumbar MedX Initial testing    AROM (full=  0-72  lumbar) 0-42   Max Extension Torque  282   Flex: ext ratio (ideal 1.4:1) 2.01:1     Date 9/9/2022 9/7/2022 9/1/2022 8/24/2022 8/22/22   Lumbar Parameters        Top Dead Center (TDC) 21 21 21 21 21   Counterbalance (CB) 410 410 410 410 410   Seat Pad 0 0 0 0 0   Femur Restraint 5 5 5 5 5   Week/Visit        Enter Week/Visit # Wk 3 V 1 Wk 2 V 2 Wk 2 V 1 Wk 1 V 2 W1V1   Weight (lbs) 138# 133# 130# 127# 125# (ex)   Reps (#) 24 25  20 15   Time 120 100  112 s 90s   ROM (degrees) 0-45 0-42 0-42 0-42 0-42   Pain     slight   Flex:Ext ratio     2.01:1     Date 9/9/2022 9/7/2022 9/7/2022 9/1/2022 8/24/2022 8/22/22   Exercise         Treadmill  X 5 min X 5 min X 5 min  X 5 min X 5 min X 5 min   Rotary Torso 90 seconds 34#'s to L 32#'s to R 32#'s to L 30# to R     Supine PPTs      X 10, 5 sec holds   Quadruped birddog      X 10 B   Lateral band walks      X 10 B green band   Single leg bridge     X 10 with 5 second holds    Supine 9090 double leg march (reverse crunch)     X 10 cues for neutral spine    Cat cow      X 5 3-5 second holds    maris pose     X 5 rocking  3-5 second holds    Prone Extensions     X 3 5 second holds                                            Layo Leonard, PT

## 2022-09-12 ENCOUNTER — TELEPHONE (OUTPATIENT)
Dept: FAMILY MEDICINE | Facility: CLINIC | Age: 37
End: 2022-09-12

## 2022-09-12 ENCOUNTER — MYC MEDICAL ADVICE (OUTPATIENT)
Dept: FAMILY MEDICINE | Facility: CLINIC | Age: 37
End: 2022-09-12

## 2022-09-12 NOTE — TELEPHONE ENCOUNTER
First Attempt: LM for patient to call back to schedule his Urgent Care f/u appt. OK to use reserved slots with Dr العراقي.

## 2022-09-12 NOTE — TELEPHONE ENCOUNTER
V2contactt message sent to patient with covering provider's recommendation to schedule UC follow-up appt.    Xavi Matute RN, BSN  [facility]

## 2022-09-12 NOTE — TELEPHONE ENCOUNTER
Incoming call from pt:  Was seen in [facility] Room 9/11 for Chest pain, needs Follow up with any available PCP within 7-10 days please call if able to fit patient in

## 2022-09-16 ENCOUNTER — OFFICE VISIT (OUTPATIENT)
Dept: FAMILY MEDICINE | Facility: CLINIC | Age: 37
End: 2022-09-16
Payer: COMMERCIAL

## 2022-09-16 ENCOUNTER — HOSPITAL ENCOUNTER (OUTPATIENT)
Dept: PHYSICAL THERAPY | Facility: CLINIC | Age: 37
Discharge: HOME | End: 2022-09-16
Payer: COMMERCIAL

## 2022-09-16 VITALS
HEART RATE: 77 BPM | DIASTOLIC BLOOD PRESSURE: 77 MMHG | BODY MASS INDEX: 34.72 KG/M2 | WEIGHT: 248 LBS | OXYGEN SATURATION: 98 % | SYSTOLIC BLOOD PRESSURE: 127 MMHG | HEIGHT: 71 IN

## 2022-09-16 DIAGNOSIS — L70.0 ACNE VULGARIS: ICD-10-CM

## 2022-09-16 DIAGNOSIS — Z23 IMMUNIZATION DUE: ICD-10-CM

## 2022-09-16 DIAGNOSIS — G89.4 CHRONIC PAIN DISORDER: Primary | ICD-10-CM

## 2022-09-16 DIAGNOSIS — G89.29 CHRONIC BILATERAL LOW BACK PAIN WITH BILATERAL SCIATICA: ICD-10-CM

## 2022-09-16 DIAGNOSIS — M54.41 CHRONIC BILATERAL LOW BACK PAIN WITH BILATERAL SCIATICA: ICD-10-CM

## 2022-09-16 DIAGNOSIS — G89.4 CHRONIC PAIN DISORDER: ICD-10-CM

## 2022-09-16 DIAGNOSIS — J45.30 MILD PERSISTENT ASTHMA, UNSPECIFIED WHETHER COMPLICATED: Primary | ICD-10-CM

## 2022-09-16 DIAGNOSIS — M47.816 LUMBAR FACET ARTHROPATHY: ICD-10-CM

## 2022-09-16 DIAGNOSIS — M51.369 DEGENERATIVE DISC DISEASE, LUMBAR: ICD-10-CM

## 2022-09-16 DIAGNOSIS — M54.42 CHRONIC BILATERAL LOW BACK PAIN WITH BILATERAL SCIATICA: ICD-10-CM

## 2022-09-16 PROCEDURE — 97140 MANUAL THERAPY 1/> REGIONS: CPT | Mod: GP | Performed by: PHYSICAL THERAPIST

## 2022-09-16 PROCEDURE — 99214 OFFICE O/P EST MOD 30 MIN: CPT | Performed by: FAMILY MEDICINE

## 2022-09-16 PROCEDURE — 97110 THERAPEUTIC EXERCISES: CPT | Mod: GP | Performed by: PHYSICAL THERAPIST

## 2022-09-16 RX ORDER — MONTELUKAST SODIUM 10 MG/1
10 TABLET ORAL AT BEDTIME
Qty: 90 TABLET | Refills: 3 | Status: SHIPPED | OUTPATIENT
Start: 2022-09-16 | End: 2023-10-13

## 2022-09-16 RX ORDER — CLINDAMYCIN AND BENZOYL PEROXIDE 10; 50 MG/G; MG/G
GEL TOPICAL
Qty: 50 G | Refills: 11 | Status: SHIPPED | OUTPATIENT
Start: 2022-09-16 | End: 2024-02-07

## 2022-09-16 RX ORDER — ALBUTEROL SULFATE 90 UG/1
AEROSOL, METERED RESPIRATORY (INHALATION)
Qty: 18 G | Refills: 2 | Status: SHIPPED | OUTPATIENT
Start: 2022-09-16 | End: 2023-02-24

## 2022-09-16 RX ORDER — METHOCARBAMOL 750 MG/1
TABLET, FILM COATED ORAL
Qty: 60 TABLET | Refills: 1 | Status: SHIPPED | OUTPATIENT
Start: 2022-09-16 | End: 2024-02-06

## 2022-09-16 ASSESSMENT — ASTHMA QUESTIONNAIRES
ACT_TOTALSCORE: 15
ACT_TOTALSCORE: 15
QUESTION_3 LAST FOUR WEEKS HOW OFTEN DID YOUR ASTHMA SYMPTOMS (WHEEZING, COUGHING, SHORTNESS OF BREATH, CHEST TIGHTNESS OR PAIN) WAKE YOU UP AT NIGHT OR EARLIER THAN USUAL IN THE MORNING: ONCE OR TWICE
QUESTION_2 LAST FOUR WEEKS HOW OFTEN HAVE YOU HAD SHORTNESS OF BREATH: THREE TO SIX TIMES A WEEK
QUESTION_5 LAST FOUR WEEKS HOW WOULD YOU RATE YOUR ASTHMA CONTROL: SOMEWHAT CONTROLLED
EMERGENCY_ROOM_LAST_YEAR_TOTAL: ONE
QUESTION_4 LAST FOUR WEEKS HOW OFTEN HAVE YOU USED YOUR RESCUE INHALER OR NEBULIZER MEDICATION (SUCH AS ALBUTEROL): ONE OR TWO TIMES PER DAY
QUESTION_1 LAST FOUR WEEKS HOW MUCH OF THE TIME DID YOUR ASTHMA KEEP YOU FROM GETTING AS MUCH DONE AT WORK, SCHOOL OR AT HOME: SOME OF THE TIME

## 2022-09-16 NOTE — PROGRESS NOTES
Subjective:  Getting his gym membership, back into a good routine. Did have an episode of chest pain/tightness, went to [facility]. Has f/u with primary today.    Assessment:  Patient seen for 5th f/u session low back pain. He is showing good tolerance to Medx equipment, will plan to continue to increased load to tolerance. We did continue manual therapy today, which patient found beneficial, encouraged patient to be diligent with stretching and exercises to get additional carryover benefit from manual therapy.    Lumbar MedX Initial testing    AROM (full=  0-72  lumbar) 0-42   Max Extension Torque  282   Flex: ext ratio (ideal 1.4:1) 2.01:1     Date 9/16/2022 9/9/2022 9/7/2022 9/1/2022 8/24/2022 8/22/22   Lumbar Parameters         Top Dead Center (TDC) 21 21 21 21 21 21   Counterbalance (CB) 410 410 410 410 410 410   Seat Pad 0 0 0 0 0 0   Femur Restraint 5 5 5 5 5 5   Week/Visit         Enter Week/Visit # Wk 3 V 2 Wk 3 V 1 Wk 2 V 2 Wk 2 V 1 Wk 1 V 2 W1V1   Weight (lbs) 143# 138# 133# 130# 127# 125# (ex)   Reps (#) 24 24 25  20 15   Time 116 120 100  112 s 90s   ROM (degrees) 0-45 0-45 0-42 0-42 0-42 0-42   Pain      slight   Flex:Ext ratio      2.01:1     Date 9/16/2022 9/9/2022 9/7/2022 9/7/2022 9/1/2022 8/24/2022 8/22/22   Exercise          Treadmill  X 5 min  X 5 min X 5 min X 5 min  X 5 min X 5 min X 5 min   Rotary Torso 90 seconds 34#'s to R 34#'s to L 32#'s to R 32#'s to L 30# to R     Supine PPTs       X 10, 5 sec holds   Quadruped birddog       X 10 B   Lateral band walks       X 10 B green band   Single leg bridge      X 10 with 5 second holds    Supine 9090 double leg march (reverse crunch)      X 10 cues for neutral spine    Cat cow       X 5 3-5 second holds    maris pose      X 5 rocking  3-5 second holds    Prone Extensions      X 3 5 second holds                                                Layo Leonard, PT

## 2022-09-16 NOTE — PROGRESS NOTES
Assessment & Plan     1. Mild persistent asthma, unspecified whether complicated  - montelukast (SINGULAIR) 10 MG tablet; Take 1 tablet (10 mg) by mouth At Bedtime  Dispense: 90 tablet; Refill: 3  - albuterol (PROAIR HFA/PROVENTIL HFA/VENTOLIN HFA) 108 (90 Base) MCG/ACT inhaler; INHALE 2 PUFFS BY MOUTH EVERY FOUR HOURS AS NEEDED  Dispense: 18 g; Refill: 2    Recent asthma exacerbation, symptoms are improving but ACT reflects recent exacerbation.  Continue Advair controller inhaler.  Add Singulair due to allergic component.  Refill albuterol as he recently lost his inhaler.  We will contact with been in 1 month to recheck ACT.    2. Chronic pain disorder  3. Chronic bilateral low back pain with bilateral sciatica  - methocarbamol (ROBAXIN) 750 MG tablet; TAKE 1 TABLET  BY MOUTH ONCE A DAY AS NEEDED. LAST REFILL, FOLLOW UP APPOINTMENT IS NEEDED  Dispense: 60 tablet; Refill: 1    Running more muscle relaxer, continues to find beneficial.  Continues to work with physical therapy.  Refill sent to pharmacy.    4. Acne vulgaris  - clindamycin-benzoyl peroxide (BENZACLIN) 1-5 % external gel; APPLY TOPICALLY TO SKIN TWICE A DAY  Dispense: 50 g; Refill: 11    Requests refill today for BenzaClin to manage acne, is helping.    5. Immunization due  - TDAP VACCINE (Adacel, Boostrix)     Return in about 6 months (around 3/16/2023) for Physical Exam + med check / sooner as needed .    Ny العراقي, [facility] is a 36 year old presenting for the following health issues:  ER F/U      History of Present Illness     Asthma:  He presents for follow up of asthma.  He has no cough, no wheezing, and no shortness of breath. He is using a relief medication a few times a week. He typically misses taking his controller medication 2 time(s) per week.Patient is aware of the following triggers: unaware of any triggers, animal dander, dust mites, humidity, mold, pollens and upper  "respiratory infections. The patient has had a visit to the Emergency Room, Urgent Care or Hospital due to asthma since the last clinic visit. He has been to the Emergency Room or Urgent Care 1 time.He has had a Hospitalization 0 times.    He eats 4 or more servings of fruits and vegetables daily.He consumes 0 sweetened beverage(s) daily.He exercises with enough effort to increase his heart rate 30 to 60 minutes per day.  He exercises with enough effort to increase his heart rate 7 days per week.   He is taking medications regularly.       ED/UC Followup:    Facility:  [facility]  Date of visit: 9/11/22  Reason for visit: Asthma, SOB, Chest pain  Current Status: Symptoms are better.    ASTHMA:   ACT Total Scores 7/20/2022 9/16/2022   ACT TOTAL SCORE (Goal Greater than or Equal to 20) 21 15   In the past 12 months, how many times did you visit the emergency room for your asthma without being admitted to the hospital? 0 1   In the past 12 months, how many times were you hospitalized overnight because of your asthma? 0 0     Struggling over the weekend. Was worried about heart because he was having some pain on the left side of his chest. Completed work up with EKG, CXR, labs. Diagnosed with asthma exacerbation, treated with prednisone. Finished prednisone today.    ACNE: Requests refill of the topical benzaclin.    HEALTH MAINTENANCE:   - TDaP: will do today.        Review of Systems   See HPI above.       Objective    /77 (BP Location: Left arm, Patient Position: Sitting, Cuff Size: Adult Regular)   Pulse 77   Ht 1.803 m (5' 11\")   Wt 112.5 kg (248 lb)   SpO2 98%   BMI 34.59 kg/m    Body mass index is 34.59 kg/m .  Physical Exam   GENERAL: healthy, alert and no distress  RESP: lungs clear to auscultation - no rales, rhonchi or wheezes  CV: regular rate and rhythm, normal S1 S2, no S3 or S4, no murmur, click or rub, no peripheral edema and peripheral pulses strong  MS: no gross musculoskeletal defects " noted, no edema

## 2022-10-04 ENCOUNTER — MYC MEDICAL ADVICE (OUTPATIENT)
Dept: PHYSICAL MEDICINE AND REHAB | Facility: CLINIC | Age: 37
End: 2022-10-04

## 2022-10-04 DIAGNOSIS — M54.41 CHRONIC BILATERAL LOW BACK PAIN WITH BILATERAL SCIATICA: Primary | ICD-10-CM

## 2022-10-04 DIAGNOSIS — M54.50 CHRONIC BILATERAL LOW BACK PAIN WITHOUT SCIATICA: ICD-10-CM

## 2022-10-04 DIAGNOSIS — M54.42 CHRONIC BILATERAL LOW BACK PAIN WITH BILATERAL SCIATICA: Primary | ICD-10-CM

## 2022-10-04 DIAGNOSIS — M47.816 LUMBAR FACET ARTHROPATHY: ICD-10-CM

## 2022-10-04 DIAGNOSIS — G89.29 CHRONIC BILATERAL LOW BACK PAIN WITHOUT SCIATICA: ICD-10-CM

## 2022-10-04 DIAGNOSIS — G89.29 CHRONIC BILATERAL LOW BACK PAIN WITH BILATERAL SCIATICA: Primary | ICD-10-CM

## 2022-10-25 ENCOUNTER — TELEPHONE (OUTPATIENT)
Dept: FAMILY MEDICINE | Facility: CLINIC | Age: 37
End: 2022-10-25

## 2022-10-25 ENCOUNTER — MYC MEDICAL ADVICE (OUTPATIENT)
Dept: FAMILY MEDICINE | Facility: CLINIC | Age: 37
End: 2022-10-25

## 2022-10-25 DIAGNOSIS — J45.50 SEVERE PERSISTENT ASTHMA WITHOUT COMPLICATION (H): Primary | ICD-10-CM

## 2022-10-25 RX ORDER — TIOTROPIUM BROMIDE 18 UG/1
18 CAPSULE ORAL; RESPIRATORY (INHALATION) DAILY
Qty: 90 CAPSULE | Refills: 3 | Status: SHIPPED | OUTPATIENT
Start: 2022-10-25 | End: 2022-11-22 | Stop reason: SINTOL

## 2022-10-25 ASSESSMENT — ASTHMA QUESTIONNAIRES
ACT_TOTALSCORE: 13
QUESTION_1 LAST FOUR WEEKS HOW MUCH OF THE TIME DID YOUR ASTHMA KEEP YOU FROM GETTING AS MUCH DONE AT WORK, SCHOOL OR AT HOME: SOME OF THE TIME
EMERGENCY_ROOM_LAST_YEAR_TOTAL: ONE
QUESTION_4 LAST FOUR WEEKS HOW OFTEN HAVE YOU USED YOUR RESCUE INHALER OR NEBULIZER MEDICATION (SUCH AS ALBUTEROL): THREE OR MORE TIMES PER DAY
QUESTION_2 LAST FOUR WEEKS HOW OFTEN HAVE YOU HAD SHORTNESS OF BREATH: ONCE A DAY
ACT_TOTALSCORE: 13
QUESTION_3 LAST FOUR WEEKS HOW OFTEN DID YOUR ASTHMA SYMPTOMS (WHEEZING, COUGHING, SHORTNESS OF BREATH, CHEST TIGHTNESS OR PAIN) WAKE YOU UP AT NIGHT OR EARLIER THAN USUAL IN THE MORNING: NOT AT ALL
QUESTION_5 LAST FOUR WEEKS HOW WOULD YOU RATE YOUR ASTHMA CONTROL: POORLY CONTROLLED

## 2022-10-25 NOTE — TELEPHONE ENCOUNTER
----- Message from Ny العراقي DO sent at 10/24/2022  9:51 AM CDT -----  Please connect with Abraham for an updated ACT score. Thank you!  Ny العراقي DO   ----- Message -----  From: Ny العراقي DO  Sent: 10/21/2022  12:00 AM CDT  To: Ny العراقي DO      ----- Message -----  From: Ny العراقي DO  Sent: 9/21/2022   9:55 PM CDT  To: Ny العراقي DO    Obtain updated ACT score.  Ny العراقي DO

## 2022-10-26 RX ORDER — IMIQUIMOD 12.5 MG/.25G
0.25 CREAM TOPICAL
Status: CANCELLED | OUTPATIENT
Start: 2022-10-26

## 2022-10-26 NOTE — TELEPHONE ENCOUNTER
Please see [employer] message. Patient is responding to the [employer] message below regarding 10/6 refill request for Aldara cream.        Stella Wilkes RN

## 2022-10-26 NOTE — TELEPHONE ENCOUNTER
ACT Total Scores 7/20/2022 9/16/2022 10/25/2022   ACT TOTAL SCORE (Goal Greater than or Equal to 20) 21 15 13   In the past 12 months, how many times did you visit the emergency room for your asthma without being admitted to the hospital? 0 1 1   In the past 12 months, how many times were you hospitalized overnight because of your asthma? 0 0 0     ACT still not at goal.  On Singulair and high dose ICS/LABA.  Will trial addition of LAMA.    1. Severe persistent asthma without complication  - tiotropium (SPIRIVA) 18 MCG inhaled capsule; Inhale 1 capsule (18 mcg) into the lungs daily  Dispense: 90 capsule; Refill: 3     Ny العراقي, DO

## 2022-11-02 ENCOUNTER — RADIOLOGY INJECTION OFFICE VISIT (OUTPATIENT)
Dept: PHYSICAL MEDICINE AND REHAB | Facility: CLINIC | Age: 37
End: 2022-11-02
Attending: NURSE PRACTITIONER
Payer: COMMERCIAL

## 2022-11-02 VITALS
SYSTOLIC BLOOD PRESSURE: 110 MMHG | OXYGEN SATURATION: 98 % | TEMPERATURE: 97.8 F | HEART RATE: 69 BPM | DIASTOLIC BLOOD PRESSURE: 68 MMHG | RESPIRATION RATE: 16 BRPM

## 2022-11-02 DIAGNOSIS — G89.29 CHRONIC BILATERAL LOW BACK PAIN WITHOUT SCIATICA: ICD-10-CM

## 2022-11-02 DIAGNOSIS — M47.816 LUMBAR FACET ARTHROPATHY: ICD-10-CM

## 2022-11-02 DIAGNOSIS — M54.50 CHRONIC BILATERAL LOW BACK PAIN WITHOUT SCIATICA: ICD-10-CM

## 2022-11-02 PROCEDURE — 64494 INJ PARAVERT F JNT L/S 2 LEV: CPT | Mod: 50 | Performed by: PAIN MEDICINE

## 2022-11-02 PROCEDURE — 64493 INJ PARAVERT F JNT L/S 1 LEV: CPT | Mod: 50 | Performed by: PAIN MEDICINE

## 2022-11-02 PROCEDURE — A9576 INJ PROHANCE MULTIPACK: HCPCS | Performed by: PAIN MEDICINE

## 2022-11-02 RX ORDER — LIDOCAINE HYDROCHLORIDE 10 MG/ML
INJECTION, SOLUTION EPIDURAL; INFILTRATION; INTRACAUDAL; PERINEURAL
Status: COMPLETED | OUTPATIENT
Start: 2022-11-02 | End: 2022-11-02

## 2022-11-02 RX ORDER — BUPIVACAINE HYDROCHLORIDE 7.5 MG/ML
INJECTION, SOLUTION EPIDURAL; RETROBULBAR
Status: COMPLETED | OUTPATIENT
Start: 2022-11-02 | End: 2022-11-02

## 2022-11-02 RX ADMIN — BUPIVACAINE HYDROCHLORIDE 3 ML: 7.5 INJECTION, SOLUTION EPIDURAL; RETROBULBAR at 09:37

## 2022-11-02 RX ADMIN — LIDOCAINE HYDROCHLORIDE 5 ML: 10 INJECTION, SOLUTION EPIDURAL; INFILTRATION; INTRACAUDAL; PERINEURAL at 09:36

## 2022-11-02 ASSESSMENT — PAIN SCALES - GENERAL
PAINLEVEL: MODERATE PAIN (5)
PAINLEVEL: NO PAIN (1)

## 2022-11-02 NOTE — PATIENT INSTRUCTIONS
Please complete your pain diary and return the diary to the [facility] at your earliest convenience.  The [facility] will contact you to schedule your next appointment after your pain diary is reviewed by your doctor.  Thank you.    DISCHARGE INSTRUCTIONS    During office hours (8:00 a.m.- 4:00 p.m.) questions or concerns may be answered  by calling [facility] Navigation Nurses at  [phone].  Messages received after hours will be returned the following business day.      In the case of an emergency, please dial 911 or seek assistance at the nearest Emergency Room/Urgent Care facility.     All Patients:  You may experience an increase in your symptoms for the first 2 days, once the numbing medication wears off.    You may resume your regular medication, no pain medication until you have completed your diary    You may shower. No swimming, tub bath or hot tub for 24 hours; remove bandage after 4 hours    Continue your activities that can cause you pain to test the blocks.                         You should not drive for the next 3 to 5 hours (have someone drive you)           POSSIBLE PROCEDURE SIDE EFFECTS  -Call Spine Center if concerned-    Increased Pain  Increased numbness/tingling     Nausea/Vomiting  Bruising/bleeding at site (hematoma)             Swelling at site (edema) Headache  Difficulty walking  Infection        Fever greater than 100.5

## 2022-11-03 DIAGNOSIS — J45.50 SEVERE PERSISTENT ASTHMA WITHOUT COMPLICATION (H): Primary | ICD-10-CM

## 2022-11-07 ENCOUNTER — HOSPITAL ENCOUNTER (OUTPATIENT)
Dept: RESPIRATORY THERAPY | Facility: CLINIC | Age: 37
Discharge: HOME | End: 2022-11-07
Admitting: INTERNAL MEDICINE
Payer: COMMERCIAL

## 2022-11-07 DIAGNOSIS — J45.50 SEVERE PERSISTENT ASTHMA WITHOUT COMPLICATION (H): ICD-10-CM

## 2022-11-07 LAB — HGB BLD-MCNC: 15.1 G/DL (ref 13.3–17.7)

## 2022-11-07 PROCEDURE — 94729 DIFFUSING CAPACITY: CPT | Mod: 26 | Performed by: INTERNAL MEDICINE

## 2022-11-07 PROCEDURE — 94729 DIFFUSING CAPACITY: CPT

## 2022-11-07 PROCEDURE — 250N000009 HC RX 250: Performed by: INTERNAL MEDICINE

## 2022-11-07 PROCEDURE — 999N000157 HC STATISTIC RCP TIME EA 10 MIN

## 2022-11-07 PROCEDURE — 94726 PLETHYSMOGRAPHY LUNG VOLUMES: CPT

## 2022-11-07 PROCEDURE — 85018 HEMOGLOBIN: CPT | Performed by: INTERNAL MEDICINE

## 2022-11-07 PROCEDURE — 94726 PLETHYSMOGRAPHY LUNG VOLUMES: CPT | Mod: 26 | Performed by: INTERNAL MEDICINE

## 2022-11-07 PROCEDURE — 94060 EVALUATION OF WHEEZING: CPT | Mod: 26 | Performed by: INTERNAL MEDICINE

## 2022-11-07 PROCEDURE — 94060 EVALUATION OF WHEEZING: CPT

## 2022-11-07 PROCEDURE — 36415 COLL VENOUS BLD VENIPUNCTURE: CPT | Performed by: INTERNAL MEDICINE

## 2022-11-07 RX ORDER — ALBUTEROL SULFATE 0.83 MG/ML
2.5 SOLUTION RESPIRATORY (INHALATION) ONCE
Status: COMPLETED | OUTPATIENT
Start: 2022-11-07 | End: 2022-11-07

## 2022-11-07 RX ADMIN — ALBUTEROL SULFATE 2.5 MG: 2.5 SOLUTION RESPIRATORY (INHALATION) at 07:35

## 2022-11-07 NOTE — PROGRESS NOTES
RESPIRATORY CARE NOTE    Complete Pulmonary Function Test completed by patient.  Good patient effort and cooperation. Albuterol 2.5 mg neb given for bronchodilation.  The results of this test meet the ATS standards for acceptability and repeatability. PT returned to baseline and left in no distress.    Bobbi Bajwa, RT  11/7/2022

## 2022-11-08 LAB
DLCOCOR-%PRED-PRE: 84 %
DLCOCOR-PRE: 28.07 ML/MIN/MMHG
DLCOUNC-%PRED-PRE: 86 %
DLCOUNC-PRE: 28.46 ML/MIN/MMHG
DLCOUNC-PRED: 33.06 ML/MIN/MMHG
ERV-%PRED-PRE: 102 %
ERV-PRE: 1.38 L
ERV-PRED: 1.35 L
EXPTIME-PRE: 6.11 SEC
FEF2575-%PRED-POST: 117 %
FEF2575-%PRED-PRE: 110 %
FEF2575-POST: 5.19 L/SEC
FEF2575-PRE: 4.88 L/SEC
FEF2575-PRED: 4.4 L/SEC
FEFMAX-%PRED-PRE: 86 %
FEFMAX-PRE: 9.15 L/SEC
FEFMAX-PRED: 10.56 L/SEC
FEV1-%PRED-PRE: 117 %
FEV1-PRE: 5.31 L
FEV1FEV6-PRE: 79 %
FEV1FEV6-PRED: 82 %
FEV1FVC-PRE: 79 %
FEV1FVC-PRED: 81 %
FEV1SVC-PRE: 74 %
FEV1SVC-PRED: 78 %
FIFMAX-PRE: 6.76 L/SEC
FRCPLETH-%PRED-PRE: 94 %
FRCPLETH-PRE: 3.32 L
FRCPLETH-PRED: 3.49 L
FVC-%PRED-PRE: 120 %
FVC-PRE: 6.73 L
FVC-PRED: 5.59 L
IC-%PRED-PRE: 130 %
IC-PRE: 5.8 L
IC-PRED: 4.44 L
RVPLETH-%PRED-PRE: 98 %
RVPLETH-PRE: 1.93 L
RVPLETH-PRED: 1.96 L
TLCPLETH-%PRED-PRE: 122 %
TLCPLETH-PRE: 9.12 L
TLCPLETH-PRED: 7.43 L
VA-%PRED-PRE: 124 %
VA-PRE: 8.79 L
VC-%PRED-PRE: 124 %
VC-PRE: 7.19 L
VC-PRED: 5.78 L

## 2022-11-14 ENCOUNTER — MYC MEDICAL ADVICE (OUTPATIENT)
Dept: FAMILY MEDICINE | Facility: CLINIC | Age: 37
End: 2022-11-14

## 2022-11-14 NOTE — TELEPHONE ENCOUNTER
Placed medication concerns in son's chart and forwarded to PCP to advise. Will close this encounter as message does not pertain to this patient.    Stella Wilkes RN

## 2022-11-22 ENCOUNTER — E-VISIT (OUTPATIENT)
Dept: FAMILY MEDICINE | Facility: CLINIC | Age: 37
End: 2022-11-22
Payer: COMMERCIAL

## 2022-11-22 DIAGNOSIS — B07.8 OTHER VIRAL WARTS: Primary | ICD-10-CM

## 2022-11-22 PROCEDURE — 99421 OL DIG E/M SVC 5-10 MIN: CPT | Performed by: FAMILY MEDICINE

## 2022-11-22 RX ORDER — IMIQUIMOD 12.5 MG/.25G
CREAM TOPICAL
Qty: 30 EACH | Refills: 2 | Status: SHIPPED | OUTPATIENT
Start: 2022-11-22

## 2022-11-23 ENCOUNTER — OFFICE VISIT (OUTPATIENT)
Dept: PULMONOLOGY | Facility: OTHER | Age: 37
End: 2022-11-23
Payer: COMMERCIAL

## 2022-11-23 ENCOUNTER — HOSPITAL ENCOUNTER (OUTPATIENT)
Dept: CT IMAGING | Facility: HOSPITAL | Age: 37
Discharge: HOME | End: 2022-11-23
Attending: INTERNAL MEDICINE | Admitting: INTERNAL MEDICINE
Payer: COMMERCIAL

## 2022-11-23 VITALS
HEART RATE: 79 BPM | BODY MASS INDEX: 35.46 KG/M2 | OXYGEN SATURATION: 99 % | SYSTOLIC BLOOD PRESSURE: 112 MMHG | WEIGHT: 253.3 LBS | DIASTOLIC BLOOD PRESSURE: 58 MMHG | HEIGHT: 71 IN

## 2022-11-23 DIAGNOSIS — J45.50 SEVERE PERSISTENT ASTHMA WITHOUT COMPLICATION (H): ICD-10-CM

## 2022-11-23 DIAGNOSIS — R07.9 CHEST PAIN, UNSPECIFIED TYPE: Primary | ICD-10-CM

## 2022-11-23 DIAGNOSIS — R07.9 CHEST PAIN, UNSPECIFIED TYPE: ICD-10-CM

## 2022-11-23 DIAGNOSIS — R06.00 DYSPNEA, UNSPECIFIED TYPE: ICD-10-CM

## 2022-11-23 PROCEDURE — 99204 OFFICE O/P NEW MOD 45 MIN: CPT | Performed by: INTERNAL MEDICINE

## 2022-11-23 PROCEDURE — 71250 CT THORAX DX C-: CPT

## 2022-11-23 ASSESSMENT — ASTHMA QUESTIONNAIRES
QUESTION_5 LAST FOUR WEEKS HOW WOULD YOU RATE YOUR ASTHMA CONTROL: POORLY CONTROLLED
ACT_TOTALSCORE: 9
ACT_TOTALSCORE: 9
QUESTION_4 LAST FOUR WEEKS HOW OFTEN HAVE YOU USED YOUR RESCUE INHALER OR NEBULIZER MEDICATION (SUCH AS ALBUTEROL): THREE OR MORE TIMES PER DAY
QUESTION_2 LAST FOUR WEEKS HOW OFTEN HAVE YOU HAD SHORTNESS OF BREATH: ONCE A DAY
QUESTION_1 LAST FOUR WEEKS HOW MUCH OF THE TIME DID YOUR ASTHMA KEEP YOU FROM GETTING AS MUCH DONE AT WORK, SCHOOL OR AT HOME: MOST OF THE TIME
QUESTION_3 LAST FOUR WEEKS HOW OFTEN DID YOUR ASTHMA SYMPTOMS (WHEEZING, COUGHING, SHORTNESS OF BREATH, CHEST TIGHTNESS OR PAIN) WAKE YOU UP AT NIGHT OR EARLIER THAN USUAL IN THE MORNING: TWO OR THREE NIGHTS A WEEK
EMERGENCY_ROOM_LAST_YEAR_TOTAL: ONE

## 2022-11-23 NOTE — PATIENT INSTRUCTIONS
It was good to meet you in clinic today. This is what we discussed:    Your lung function test shows normal lung function, but this does not rule out asthma as a cause of your symptoms.  Continue Advair two inhalations twice daily. Rinse, gargle, and spit water after use.  Continue montelukast (Singulair) one pill at bedtime.  Use albuterol as needed.  Go to the lab to get blood drawn to look for evidence of allergic inflammation.  Use the peak flow meter at different times of day and with and without breathing symptoms or chest pain and keep a log of results.  We will schedule a chest CT.  I will contact you over [employer] with results.  We will have you follow up in 3 months.  Contact me with questions or concerns.    Woodrow Luis MD  Pulmonary and Critical Care Medicine  [facility]  Office [phone]

## 2022-11-23 NOTE — PROGRESS NOTES
Pulmonary Clinic Outpatient Consultation    Assessment and Plan:   37 year old male former smoker with a history of asthma, intermittent GERD, intermittent allergic rhinitis, presenting for evaluation of chronic dyspnea, wheezing, and chest pain.    Chronic dyspnea, wheezing, and chest pain: Certainly could have asthma, though DDx includes GERD or other etiology. Symptoms somewhat atypical, including multifocal areas of intermittent sharp chest pain. Does note worsening symptoms around cats and dogs at home, and evening/nighttime symptoms of dyspnea, chest tightness, wheeze. Of note, he is able to run for >35 minutes on elliptical with HR in 120s and lift weights without dyspnea. Removed carpeting and has all hardwood floors which will reduce allergen exposure. Already on high-dose ICS-LABA; does not use a holding chamber but he feels that his inhaler technique is adequate without it. On montelukast. Trialled tiotropium handihaler but felt that it made his breathing worse. Pulmonary function test is completely normal, CXR clear. With his level of concern and areas of chest pain, will obtain high-resolution chest CT, though with normal PFT the yield is likely low. Will obtain CBC with diff, IgE, and respiratory allergen-specific IgE panel.    Plan:  - high-resolution chest CT  - CBC with diff, IgE, respiratory allergen-specific IgE panel  - I will send him a [employer] message with results  - provided peak flow meter with instructions on use; advised keeping a log of results at different times of day and with different symptom levels  - continue fluticasone-salmeterol -21 mcg two inhalations BID; rinse/gargle/spit water after use; he feels that his inhaler technique is adequate without use of a holding chamber  - continue montelukast 10 mg at bedtime  - continue loratadine 10 mg daily  - consider trial of PPI in future  - had worsening respiratory symptoms with trial of LAMA  - action plan in case of  exacerbation: prednisone 40 mg daily for 5 days  - recommend remaining up to date on respiratory vaccinations  - follow up in 3 months with me or NP  - encouraged him to contact us with questions or concerning symptoms    Woodrow Luis MD  [facility]  Office [phone]  Pager [phone]  he/him/his    CCx: chronic dyspnea, wheezing, and chest pain    HPI: 37 year old male former smoker with a history of asthma, presenting for evaluation of chronic dyspnea, wheezing, and chest pain. Has had years of respiratory symptoms, worse over the last year. Notes chest pain in different parts of the chest. Evening and nighttime are worse, occasionally awakens with dyspnea. Has been on prednisone 3 times, and seemed to improve symptoms. Exercises on elliptical, runs on it with HR 120s for >35 minutes without difficulty. Lifts weights without dyspnea. Diagnosed with asthma in his early-mid 20s, started on controller late 20s. Started on montelukast 3 months ago; may be helping somewhat. Has allergic response to dogs and cats at times; does have both at home. Removed carpeting, has all hardwood floors. Perfume leads to dyspnea. Occasional postnasal drainage. Occasional reflux but feels this is different from the chest pain occurring with dyspnea/wheeze. Started smoking age 21, up to 4 ppd, quit around 2015. Vaped until 2018. Has two HEPA filters in his home. Both parents have asthma.    ROS:  A 12-system review was obtained and was negative with the exception of the symptoms endorsed in the history of present illness.    PMH:  former smoker  Asthma  intermittent GERD  intermittent allergic rhinitis  BMI 35.3    PSH:  No past surgical history on file.    Allergies:  No Known Allergies    Family HX:  Family History   Problem Relation Age of Onset     Arthritis Mother      Obesity Mother      Diabetes Mother      Prostate Cancer Father        Social Hx:  Social History     Socioeconomic History     Marital  status: Single     Spouse name: Not on file     Number of children: Not on file     Years of education: Not on file     Highest education level: Not on file   Occupational History     Not on file   Tobacco Use     Smoking status: Former     Packs/day: 4.00     Types: Cigarettes     Quit date:      Years since quittin.8     Smokeless tobacco: Never   Vaping Use     Vaping Use: Former   Substance and Sexual Activity     Alcohol use: Not Currently     Comment: a few times a week     Drug use: Not on file     Sexual activity: Yes   Other Topics Concern     Not on file   Social History Narrative     Not on file     Social Determinants of Health     Financial Resource Strain: Not on file   Food Insecurity: Not on file   Transportation Needs: Not on file   Physical Activity: Not on file   Stress: Not on file   Social Connections: Not on file   Intimate Partner Violence: Not on file   Housing Stability: Not on file       Current Meds:  Current Outpatient Medications   Medication Sig Dispense Refill     albuterol (PROAIR HFA/PROVENTIL HFA/VENTOLIN HFA) 108 (90 Base) MCG/ACT inhaler INHALE 2 PUFFS BY MOUTH EVERY FOUR HOURS AS NEEDED 18 g 2     amphetamine-dextroamphetamine (ADDERALL) 30 MG tablet        clindamycin-benzoyl peroxide (BENZACLIN) 1-5 % external gel APPLY TOPICALLY TO SKIN TWICE A DAY 50 g 11     fluticasone-salmeterol (ADVAIR HFA) 230-21 MCG/ACT inhaler Inhale 2 puffs BY MOUTH twice a day. 36 g 3     imiquimod (ALDARA) 5 % external cream Apply once daily at bedtime until warts completely disappear up to a maximum of 12 week 30 each 2     loratadine (CLARITIN) 10 MG tablet Take 10 mg by mouth       methocarbamol (ROBAXIN) 750 MG tablet TAKE 1 TABLET  BY MOUTH ONCE A DAY AS NEEDED. LAST REFILL, FOLLOW UP APPOINTMENT IS NEEDED 60 tablet 1     montelukast (SINGULAIR) 10 MG tablet Take 1 tablet (10 mg) by mouth At Bedtime 90 tablet 3       Physical Exam:  /58 (BP Location: Right arm)   Pulse 79   Ht  "1.803 m (5' 11\")   Wt 114.9 kg (253 lb 4.8 oz)   SpO2 99%   BMI 35.33 kg/m    Gen: alert, oriented, no distress  HEENT: nasal mucosa is unremarkable, no oropharyngeal lesions, no cervical or supraclavicular lymphadenopathy  CV: RRR, no M/G/R  Resp: CTAB, no focal crackles or wheezes  Skin: no apparent rashes  Ext: no cyanosis, clubbing or edema  Neuro: alert, nonfocal    Labs:  reviewed    Imaging studies:  CXR (September 2022):  - clear lungs    Pulmonary Function Testing  November 2022:  FVC 6.73 (120%)  FEV1 5.31 (117%)  FEV1/FVC 0.79  No bronchodilator response  RV 1.93 (98%)  TLC 9.12 (122%)  RV/TLC 0.21 (74%)  DLCOc 84%    "

## 2022-11-23 NOTE — LETTER
11/23/2022         RE: Duarte Cuellar  [address]  [facility] [address]        Dear Colleague,    Thank you for referring your patient, Duarte Cuellar, to the [facility]. Please see a copy of my visit note below.    Pulmonary Clinic Outpatient Consultation    Assessment and Plan:   37 year old male former smoker with a history of asthma, intermittent GERD, intermittent allergic rhinitis, presenting for evaluation of chronic dyspnea, wheezing, and chest pain.    Chronic dyspnea, wheezing, and chest pain: Certainly could have asthma, though DDx includes GERD or other etiology. Symptoms somewhat atypical, including multifocal areas of intermittent sharp chest pain. Does note worsening symptoms around cats and dogs at home, and evening/nighttime symptoms of dyspnea, chest tightness, wheeze. Of note, he is able to run for >35 minutes on elliptical with HR in 120s and lift weights without dyspnea. Removed carpeting and has all hardwood floors which will reduce allergen exposure. Already on high-dose ICS-LABA; does not use a holding chamber but he feels that his inhaler technique is adequate without it. On montelukast. Trialled tiotropium handihaler but felt that it made his breathing worse. Pulmonary function test is completely normal, CXR clear. With his level of concern and areas of chest pain, will obtain high-resolution chest CT, though with normal PFT the yield is likely low. Will obtain CBC with diff, IgE, and respiratory allergen-specific IgE panel.    Plan:  - high-resolution chest CT  - CBC with diff, IgE, respiratory allergen-specific IgE panel  - I will send him a [employer] message with results  - provided peak flow meter with instructions on use; advised keeping a log of results at different times of day and with different symptom levels  - continue fluticasone-salmeterol -21 mcg two inhalations BID; rinse/gargle/spit water after use; he feels that his inhaler  technique is adequate without use of a holding chamber  - continue montelukast 10 mg at bedtime  - continue loratadine 10 mg daily  - consider trial of PPI in future  - had worsening respiratory symptoms with trial of LAMA  - action plan in case of exacerbation: prednisone 40 mg daily for 5 days  - recommend remaining up to date on respiratory vaccinations  - follow up in 3 months with me or NP  - encouraged him to contact us with questions or concerning symptoms    Woodrow Luis MD  [facility]  Office [phone]  Pager [phone]  he/him/his    CCx: chronic dyspnea, wheezing, and chest pain    HPI: 37 year old male former smoker with a history of asthma, presenting for evaluation of chronic dyspnea, wheezing, and chest pain. Has had years of respiratory symptoms, worse over the last year. Notes chest pain in different parts of the chest. Evening and nighttime are worse, occasionally awakens with dyspnea. Has been on prednisone 3 times, and seemed to improve symptoms. Exercises on elliptical, runs on it with HR 120s for >35 minutes without difficulty. Lifts weights without dyspnea. Diagnosed with asthma in his early-mid 20s, started on controller late 20s. Started on montelukast 3 months ago; may be helping somewhat. Has allergic response to dogs and cats at times; does have both at home. Removed carpeting, has all hardwood floors. Perfume leads to dyspnea. Occasional postnasal drainage. Occasional reflux but feels this is different from the chest pain occurring with dyspnea/wheeze. Started smoking age 21, up to 4 ppd, quit around 2015. Vaped until 2018. Has two HEPA filters in his home. Both parents have asthma.    ROS:  A 12-system review was obtained and was negative with the exception of the symptoms endorsed in the history of present illness.    PMH:  former smoker  Asthma  intermittent GERD  intermittent allergic rhinitis  BMI 35.3    PSH:  No past surgical history on  file.    Allergies:  No Known Allergies    Family HX:  Family History   Problem Relation Age of Onset     Arthritis Mother      Obesity Mother      Diabetes Mother      Prostate Cancer Father        Social Hx:  Social History     Socioeconomic History     Marital status: Single     Spouse name: Not on file     Number of children: Not on file     Years of education: Not on file     Highest education level: Not on file   Occupational History     Not on file   Tobacco Use     Smoking status: Former     Packs/day: 4.00     Types: Cigarettes     Quit date:      Years since quittin.8     Smokeless tobacco: Never   Vaping Use     Vaping Use: Former   Substance and Sexual Activity     Alcohol use: Not Currently     Comment: a few times a week     Drug use: Not on file     Sexual activity: Yes   Other Topics Concern     Not on file   Social History Narrative     Not on file     Social Determinants of Health     Financial Resource Strain: Not on file   Food Insecurity: Not on file   Transportation Needs: Not on file   Physical Activity: Not on file   Stress: Not on file   Social Connections: Not on file   Intimate Partner Violence: Not on file   Housing Stability: Not on file       Current Meds:  Current Outpatient Medications   Medication Sig Dispense Refill     albuterol (PROAIR HFA/PROVENTIL HFA/VENTOLIN HFA) 108 (90 Base) MCG/ACT inhaler INHALE 2 PUFFS BY MOUTH EVERY FOUR HOURS AS NEEDED 18 g 2     amphetamine-dextroamphetamine (ADDERALL) 30 MG tablet        clindamycin-benzoyl peroxide (BENZACLIN) 1-5 % external gel APPLY TOPICALLY TO SKIN TWICE A DAY 50 g 11     fluticasone-salmeterol (ADVAIR HFA) 230-21 MCG/ACT inhaler Inhale 2 puffs BY MOUTH twice a day. 36 g 3     imiquimod (ALDARA) 5 % external cream Apply once daily at bedtime until warts completely disappear up to a maximum of 12 week 30 each 2     loratadine (CLARITIN) 10 MG tablet Take 10 mg by mouth       methocarbamol (ROBAXIN) 750 MG tablet TAKE 1  "TABLET  BY MOUTH ONCE A DAY AS NEEDED. LAST REFILL, FOLLOW UP APPOINTMENT IS NEEDED 60 tablet 1     montelukast (SINGULAIR) 10 MG tablet Take 1 tablet (10 mg) by mouth At Bedtime 90 tablet 3       Physical Exam:  /58 (BP Location: Right arm)   Pulse 79   Ht 1.803 m (5' 11\")   Wt 114.9 kg (253 lb 4.8 oz)   SpO2 99%   BMI 35.33 kg/m    Gen: alert, oriented, no distress  HEENT: nasal mucosa is unremarkable, no oropharyngeal lesions, no cervical or supraclavicular lymphadenopathy  CV: RRR, no M/G/R  Resp: CTAB, no focal crackles or wheezes  Skin: no apparent rashes  Ext: no cyanosis, clubbing or edema  Neuro: alert, nonfocal    Labs:  reviewed    Imaging studies:  CXR (September 2022):  - clear lungs    Pulmonary Function Testing  November 2022:  FVC 6.73 (120%)  FEV1 5.31 (117%)  FEV1/FVC 0.79  No bronchodilator response  RV 1.93 (98%)  TLC 9.12 (122%)  RV/TLC 0.21 (74%)  DLCOc 84%        Again, thank you for allowing me to participate in the care of your patient.        Sincerely,        Woodrow Luis MD  "

## 2022-11-25 ENCOUNTER — DOCUMENTATION ONLY (OUTPATIENT)
Dept: PULMONOLOGY | Facility: OTHER | Age: 37
End: 2022-11-25

## 2022-11-25 ENCOUNTER — TELEPHONE (OUTPATIENT)
Dept: PULMONOLOGY | Facility: OTHER | Age: 37
End: 2022-11-25

## 2022-11-25 DIAGNOSIS — J45.50 SEVERE PERSISTENT ASTHMA WITHOUT COMPLICATION (H): Primary | ICD-10-CM

## 2022-11-25 RX ORDER — PREDNISONE 20 MG/1
TABLET ORAL
Qty: 10 TABLET | Refills: 0 | Status: SHIPPED | OUTPATIENT
Start: 2022-11-25 | End: 2023-02-24

## 2022-11-25 NOTE — LETTER
"2022    Dear Dr. العراقي,    See below for documentation regarding Duarte Cuellar ( 1985). Please feel free to call me at any time if needed.    Sincerely,    Woodrow Luis MD  Pulmonary and Critical Care Medicine  [facility]  Cell [phone]  Office [phone]  Pager [phone]  (he/him/his)    Pulmonary Documentation    Reviewed the chest CT images and report and sent Abraham the following [employer] message:    Hi Abraham,    Reviewing your chest CT images, your lungs look completely normal. The radiologist comments on \"mild bronchiectasis,\" which is a subtle diagnosis meaning a few airways look a bit bigger than normal, but on my review your airways look normal. In response to your messages, there is no evidence of fluid in the lungs, pneumonia, or fungal infection. We should continue with the plan we discussed in clinic.    Let me know if you have questions.    Sincerely,  Kevin Luis                  "

## 2022-11-25 NOTE — PROGRESS NOTES
"Pulmonary Documentation    Reviewed the chest CT images and report and sent Abraham the following [employer] message:    Hi Abraham,    Reviewing your chest CT images, your lungs look completely normal. The radiologist comments on \"mild bronchiectasis,\" which is a subtle diagnosis meaning a few airways look a bit bigger than normal, but on my review your airways look normal. In response to your messages, there is no evidence of fluid in the lungs, pneumonia, or fungal infection. We should continue with the plan we discussed in clinic.    Let me know if you have questions.    Sincerely,  Kevin Luis  "

## 2022-11-25 NOTE — TELEPHONE ENCOUNTER
"Phone call from Arian Bain had Ct chest done on Wed 11/23 and has not gotten results. Is having more sob and feels like there is \"fluid in his lungs\".  Coughing all night long and cannot sleep.  Wondering if he has fungal infection or pneumonia?     Checked on CT chest and has not been read yet by radiology. Spoke with radiologist and he will make sure that this is read asap.    Will prescribe \"action plan\" prednisone for patient for now and instructed that he should also get labs drawn that were ordered by Dr. Luis.  CT results will go to Dr. Luis as soon as they are read.     If not better over the weekend or deteriorates, he should go to Urgent Care.  "

## 2022-12-26 NOTE — PROGRESS NOTES
[facility]    Outpatient Physical Therapy Discharge Note  Patient: Duarte Cuellar  : 1985    Beginning/End Dates of Reporting Period:  22 to 22    Referring Provider: Apoorva Sparks CNP     Therapy Diagnosis: B low back pain; core and hip weakness     Client Self Report: Getting his gym membership, back into a good routine. Did have an episode of chest pain/tightness, went to [facility]. Has f/u with primary today.    Objective Measurements:  See note    Goals:  Goal Identifier STEVIE   Goal Description Patient will demonstrate at least 10% improvement on the STEVIE to show improvement in function.   Target Date 10/31/22   Date Met      Progress (detail required for progress note):  Not Tested     Goal Identifier Lifting   Goal Description Patient will demonstrate ability to lift at least 25# with no pain to improve ability to do house and yard work.   Target Date 10/31/22   Date Met      Progress (detail required for progress note):   Met     Goal Identifier Walking   Goal Description Patient will improve walking tolerance to at least 45 minutes with no pain to return to PLOF.   Target Date 22   Date Met      Progress (detail required for progress note):   Met     Plan:  Discharge from therapy.    Discharge:    Reason for Discharge: Patient has met all goals.    Equipment Issued: NA    Discharge Plan: Patient to continue home program.    Layo Leonard, PT

## 2022-12-26 NOTE — ADDENDUM NOTE
Encounter addended by: Layo Leonard, PT on: 12/26/2022 9:03 AM   Actions taken: Episode resolved, Clinical Note Signed

## 2023-02-22 NOTE — PROGRESS NOTES
Pulmonary Clinic Follow-Up          Assessment/Plan:     37 year old male former smoker with a history of asthma, intermittent GERD, intermittent allergic rhinitis, presenting for follow-up of chronic dyspnea, wheezing, and chest pain.    Moderate persistent asthma  Endorses hx of asthma.  PFTs were normal.  High-resolution chest CT performed, reviewed by Dr Luis, found to be normal.  On Advair, which he finds benefit from.  Trialed tiotropium in past but felt breathing was worse.  One possible exacerbation in 11/2022, resolved with prednisone burst.  Today he denies dyspnea and wheezing, but has ongoing chest pain, see below.  Peak flow meter results anywhere from 625 to over 700.  Plan:  - continue advair 230/21 two puffs BID, rinse mouth after use.  - continue albuterol PRN  - Action plan: prednisone 40mg x5 days    Allergies  Rhinorrhea  Post-nasal drip  He endorses hx of allergies to animals and other environmental allergens.  Endorses rhinorrhea and post-nasal drip throughout day.  He has multiple cats in his home.  CBC with diff, IgE, and respiratory allergen-specific IgE panel ordered last visit, not done yet.  Plan:  - continue montelukast 10mg  - continue claritin  - start ipratropium (atrovent) nasal spray BID-TID  - he will go to [facility] work today    GERD  Hx of acid reflux.  He continues to have intermittent chest pain that is substernal.  He presented to ED in 9/2022 with this chest pain and ECG showed no ischemic changes, troponin negative, CXR clear and D-dimer normal.  Plan:  - start omeprazole 20mg BID  - discussed lifestyle changes including not eating close to bedtime, potential food/drink triggers, and raising head of bed.    Follow-up  - one month via video visit      Paige De Anda CNP  Pulmonary Medicine  [facility]  [phone]           CC:     chronic dyspnea, wheezing, and chest pain - follow-up     HPI:     37 year old male former  smoker with a history of asthma, intermittent GERD, intermittent allergic rhinitis, presenting for follow-up of chronic dyspnea, wheezing, and chest pain.    Previous:  Has had years of respiratory symptoms, worse over the last year. Notes chest pain in different parts of the chest. Evening and nighttime are worse, occasionally awakens with dyspnea. Has been on prednisone 3 times, and seemed to improve symptoms. Exercises on elliptical, runs on it with HR 120s for >35 minutes without difficulty. Lifts weights without dyspnea. Diagnosed with asthma in his early-mid 20s, started on controller late 20s. Started on montelukast 3 months ago; may be helping somewhat. Has allergic response to dogs and cats at times; does have both at home. Removed carpeting, has all hardwood floors. Perfume leads to dyspnea. Occasional postnasal drainage. Occasional reflux but feels this is different from the chest pain occurring with dyspnea/wheeze. Started smoking age 21, up to 4 ppd, quit around 2015. Vaped until 2018. Has two HEPA filters in his home. Both parents have asthma.    Today he endorses his dyspnea and wheezing have resolved since his prednisone burst in 11/2022, but he continues to have intermittent chest pain throughout entire day.  It is substernal and he points across entire chest.   He continues on advair BID, rinses mouth.  Did find benefit after starting this.  Using Albuterol once-twice/day d/t chest pain, but does not find much benefit.  No cough, some phlegm in morning.  Does endorse PND and rhinorrhea - goes through kleenex box quickly.  No sinus congestion or pain.  No wt loss, night sweats, muscle aches, fever.  Peak flow meter results average 625- over 700.  Never saw allergist in past but states he does have multiple allergies.  He eats dinner about 5pm and goes to bed few hours later, does not snack before bed.    ACT Total Scores 9/16/2022 10/25/2022 11/23/2022   ACT TOTAL SCORE (Goal Greater than or Equal  to 20) 15 13 9   In the past 12 months, how many times did you visit the emergency room for your asthma without being admitted to the hospital? 1 1 1   In the past 12 months, how many times were you hospitalized overnight because of your asthma? 0 0 0          ROS:     A 6-system review was obtained and was negative with the exception of the symptoms endorsed in the history of present illness.     Medical history:     PMH:  former smoker  Asthma  intermittent GERD  intermittent allergic rhinitis  BMI 35.3    PSH:  No past surgical history on file.    Allergies:  No Known Allergies    Family HX:  Family History   Problem Relation Age of Onset     Arthritis Mother      Obesity Mother      Diabetes Mother      Prostate Cancer Father        Social Hx:  Social History     Socioeconomic History     Marital status: Single     Spouse name: Not on file     Number of children: Not on file     Years of education: Not on file     Highest education level: Not on file   Occupational History     Not on file   Tobacco Use     Smoking status: Former     Packs/day: 4.00     Types: Cigarettes     Quit date: 2013     Years since quitting: 10.1     Smokeless tobacco: Never   Vaping Use     Vaping Use: Former   Substance and Sexual Activity     Alcohol use: Not Currently     Comment: a few times a week     Drug use: Not on file     Sexual activity: Yes   Other Topics Concern     Not on file   Social History Narrative     Not on file     Social Determinants of Health     Financial Resource Strain: Not on file   Food Insecurity: Not on file   Transportation Needs: Not on file   Physical Activity: Not on file   Stress: Not on file   Social Connections: Not on file   Intimate Partner Violence: Not on file   Housing Stability: Not on file       Current Meds:  Current Outpatient Medications   Medication Sig Dispense Refill     albuterol (PROAIR HFA/PROVENTIL HFA/VENTOLIN HFA) 108 (90 Base) MCG/ACT inhaler INHALE 2 PUFFS BY MOUTH EVERY FOUR  HOURS AS NEEDED 18 g 2     clindamycin-benzoyl peroxide (BENZACLIN) 1-5 % external gel APPLY TOPICALLY TO SKIN TWICE A DAY 50 g 11     fluticasone-salmeterol (ADVAIR HFA) 230-21 MCG/ACT inhaler Inhale 2 puffs BY MOUTH twice a day. (Patient taking differently: Inhale 2 puffs into the lungs daily Inhale 2 puffs BY MOUTH twice a day.) 36 g 3     imiquimod (ALDARA) 5 % external cream Apply once daily at bedtime until warts completely disappear up to a maximum of 12 week 30 each 2     ipratropium (ATROVENT) 0.06 % nasal spray Spray 2 sprays into both nostrils 3 times daily 15 mL 11     loratadine (CLARITIN) 10 MG tablet Take 10 mg by mouth daily       methocarbamol (ROBAXIN) 750 MG tablet TAKE 1 TABLET  BY MOUTH ONCE A DAY AS NEEDED. LAST REFILL, FOLLOW UP APPOINTMENT IS NEEDED 60 tablet 1     montelukast (SINGULAIR) 10 MG tablet Take 1 tablet (10 mg) by mouth At Bedtime 90 tablet 3     omeprazole (PRILOSEC) 20 MG DR capsule Take 1 capsule (20 mg) by mouth 2 times daily 60 capsule 11        Physical Exam:       /68 (BP Location: Left arm, Patient Position: Chair, Cuff Size: Adult Large)   Pulse 80   Wt 122 kg (269 lb)   SpO2 99%   BMI 37.52 kg/m    Gen: adult male, appears in NAD  HEENT: clear conjunctivae, mask in place  CV: RRR, no M/G/R  Resp: CTAB, no focal crackles or wheezes.  Respirations even and unlabored.  On RA.  No cough during exam.  Skin: no apparent rashes on visible skin.  Ext: no cyanosis, clubbing or edema  Neuro: alert, nonfocal       Data:       Imaging studies:    EXAM: CT CHEST HI-RESOLUTION WO CONTRAST  LOCATION: [facility]  DATE/TIME: 11/23/2022 7:09 PM  INDICATION: Severe persistent asthma without complication, chest pain, unspecified type, dyspnea, unspecified type.  COMPARISON: None.  FINDINGS:   LUNGS AND PLEURA: There are a couple of right lower lobe pulmonary nodules on series 3 images 158 and 160 which demonstrate a central lucency, likely representing  intrapulmonary lymph nodes. No acute airspace opacities. No pleural effusion or   pneumothorax. There is mild bilateral lower lobe bronchiectasis. No air trapping, subpleural reticular changes, or honeycombing.  MEDIASTINUM/AXILLAE: Heart size is normal. The thoracic aorta is normal in caliber. There is no lymphadenopathy.  CORONARY ARTERY CALCIFICATION: None.  UPPER ABDOMEN: No significant finding.  MUSCULOSKELETAL: Unremarkable.                                                                   IMPRESSION:   1.  Aside from a couple of probable right lower lobe intrapulmonary lymph nodes, the lungs are clear.  2.  Mild bilateral lower lobe bronchiectasis.      CXR (September 2022):  - clear lungs    Pulmonary Function Testing  November 2022:  FVC 6.73 (120%)  FEV1 5.31 (117%)  FEV1/FVC 0.79  No bronchodilator response  RV 1.93 (98%)  TLC 9.12 (122%)  RV/TLC 0.21 (74%)  DLCOc 84%

## 2023-02-24 ENCOUNTER — OFFICE VISIT (OUTPATIENT)
Dept: PULMONOLOGY | Facility: CLINIC | Age: 38
End: 2023-02-24
Payer: COMMERCIAL

## 2023-02-24 ENCOUNTER — LAB (OUTPATIENT)
Dept: LAB | Facility: HOSPITAL | Age: 38
End: 2023-02-24
Payer: COMMERCIAL

## 2023-02-24 VITALS
BODY MASS INDEX: 37.52 KG/M2 | OXYGEN SATURATION: 99 % | WEIGHT: 269 LBS | HEART RATE: 80 BPM | DIASTOLIC BLOOD PRESSURE: 68 MMHG | SYSTOLIC BLOOD PRESSURE: 118 MMHG

## 2023-02-24 DIAGNOSIS — J45.50 SEVERE PERSISTENT ASTHMA WITHOUT COMPLICATION (H): ICD-10-CM

## 2023-02-24 DIAGNOSIS — J45.40 MODERATE PERSISTENT ASTHMA WITHOUT COMPLICATION: ICD-10-CM

## 2023-02-24 DIAGNOSIS — K21.9 GASTROESOPHAGEAL REFLUX DISEASE WITHOUT ESOPHAGITIS: ICD-10-CM

## 2023-02-24 DIAGNOSIS — R09.82 ALLERGIC RHINITIS WITH POSTNASAL DRIP: Primary | ICD-10-CM

## 2023-02-24 DIAGNOSIS — J30.9 ALLERGIC RHINITIS WITH POSTNASAL DRIP: Primary | ICD-10-CM

## 2023-02-24 DIAGNOSIS — J34.89 RHINORRHEA: ICD-10-CM

## 2023-02-24 LAB
BASOPHILS # BLD AUTO: 0 10E3/UL (ref 0–0.2)
BASOPHILS NFR BLD AUTO: 1 %
EOSINOPHIL # BLD AUTO: 0.2 10E3/UL (ref 0–0.7)
EOSINOPHIL NFR BLD AUTO: 4 %
ERYTHROCYTE [DISTWIDTH] IN BLOOD BY AUTOMATED COUNT: 12.3 % (ref 10–15)
HCT VFR BLD AUTO: 47 % (ref 40–53)
HGB BLD-MCNC: 16.1 G/DL (ref 13.3–17.7)
IMM GRANULOCYTES # BLD: 0 10E3/UL
IMM GRANULOCYTES NFR BLD: 0 %
LYMPHOCYTES # BLD AUTO: 1.9 10E3/UL (ref 0.8–5.3)
LYMPHOCYTES NFR BLD AUTO: 36 %
MCH RBC QN AUTO: 30.4 PG (ref 26.5–33)
MCHC RBC AUTO-ENTMCNC: 34.3 G/DL (ref 31.5–36.5)
MCV RBC AUTO: 89 FL (ref 78–100)
MONOCYTES # BLD AUTO: 0.4 10E3/UL (ref 0–1.3)
MONOCYTES NFR BLD AUTO: 8 %
NEUTROPHILS # BLD AUTO: 2.7 10E3/UL (ref 1.6–8.3)
NEUTROPHILS NFR BLD AUTO: 51 %
NRBC # BLD AUTO: 0 10E3/UL
NRBC BLD AUTO-RTO: 0 /100
PLATELET # BLD AUTO: 197 10E3/UL (ref 150–450)
RBC # BLD AUTO: 5.29 10E6/UL (ref 4.4–5.9)
WBC # BLD AUTO: 5.2 10E3/UL (ref 4–11)

## 2023-02-24 PROCEDURE — 86003 ALLG SPEC IGE CRUDE XTRC EA: CPT

## 2023-02-24 PROCEDURE — 99214 OFFICE O/P EST MOD 30 MIN: CPT | Performed by: NURSE PRACTITIONER

## 2023-02-24 PROCEDURE — 85004 AUTOMATED DIFF WBC COUNT: CPT

## 2023-02-24 PROCEDURE — 82785 ASSAY OF IGE: CPT

## 2023-02-24 PROCEDURE — 36415 COLL VENOUS BLD VENIPUNCTURE: CPT

## 2023-02-24 RX ORDER — IPRATROPIUM BROMIDE 42 UG/1
2 SPRAY, METERED NASAL 3 TIMES DAILY
Qty: 15 ML | Refills: 11 | Status: SHIPPED | OUTPATIENT
Start: 2023-02-24

## 2023-02-24 RX ORDER — ALBUTEROL SULFATE 90 UG/1
AEROSOL, METERED RESPIRATORY (INHALATION)
Qty: 18 G | Refills: 2 | Status: SHIPPED | OUTPATIENT
Start: 2023-02-24 | End: 2023-11-10

## 2023-02-24 NOTE — PATIENT INSTRUCTIONS
It was a pleasure to see you in clinic today.   Here is what we discussed:    Continue advair two puffs twice daily, rinse mouth after use.  Continue singulair and claritin.  Start atrovent nasal spray, two sprays twice daily.  Start prilosec 20mg twice daily.  Have blood work done today.  We will contact you with blood work results.    Paige De Anda, CNP  Pulmonary Medicine  [facility]  [phone]

## 2023-02-27 LAB
A ALTERNATA IGE QN: <0.1 KU(A)/L
A FUMIGATUS IGE QN: <0.1 KU(A)/L
BERMUDA GRASS IGE QN: 0.61 KU(A)/L
C HERBARUM IGE QN: <0.1 KU(A)/L
CAT DANDER IGG QN: 62.4 KU(A)/L
CEDAR IGE QN: <0.1 KU(A)/L
COMMON RAGWEED IGE QN: 1.19 KU(A)/L
COTTONWOOD IGE QN: <0.1 KU(A)/L
D FARINAE IGE QN: 3.4 KU(A)/L
D PTERONYSS IGE QN: 1.38 KU(A)/L
DOG DANDER+EPITH IGE QN: 4.42 KU(A)/L
IGE SERPL-ACNC: 230 KU/L (ref 0–114)
IGE SERPL-ACNC: 230 KU/L (ref 0–114)
MAPLE IGE QN: <0.1 KU(A)/L
MARSH ELDER IGE QN: <0.1 KU(A)/L
MOUSE URINE PROT IGE QN: <0.1 KU(A)/L
NETTLE IGE QN: <0.1 KU(A)/L
P NOTATUM IGE QN: <0.1 KU(A)/L
ROACH IGE QN: <0.1 KU(A)/L
SALTWORT IGE QN: <0.1 KU(A)/L
SILVER BIRCH IGE QN: <0.1 KU(A)/L
TIMOTHY IGE QN: 10.6 KU(A)/L
WHITE ASH IGE QN: <0.1 KU(A)/L
WHITE ELM IGE QN: <0.1 KU(A)/L
WHITE MULBERRY IGE QN: <0.1 KU(A)/L
WHITE OAK IGE QN: <0.1 KU(A)/L

## 2023-02-28 DIAGNOSIS — J30.9 ALLERGIC RHINITIS WITH POSTNASAL DRIP: Primary | ICD-10-CM

## 2023-02-28 DIAGNOSIS — J34.89 RHINORRHEA: ICD-10-CM

## 2023-02-28 DIAGNOSIS — R09.82 ALLERGIC RHINITIS WITH POSTNASAL DRIP: Primary | ICD-10-CM

## 2023-02-28 DIAGNOSIS — J45.50 SEVERE PERSISTENT ASTHMA WITHOUT COMPLICATION (H): ICD-10-CM

## 2023-03-10 ENCOUNTER — VIRTUAL VISIT (OUTPATIENT)
Dept: FAMILY MEDICINE | Facility: CLINIC | Age: 38
End: 2023-03-10
Payer: COMMERCIAL

## 2023-03-10 DIAGNOSIS — F41.1 GENERALIZED ANXIETY DISORDER: Primary | ICD-10-CM

## 2023-03-10 PROCEDURE — 99214 OFFICE O/P EST MOD 30 MIN: CPT | Mod: VID | Performed by: FAMILY MEDICINE

## 2023-03-10 RX ORDER — ESCITALOPRAM OXALATE 10 MG/1
10 TABLET ORAL DAILY
Qty: 90 TABLET | Refills: 3 | Status: SHIPPED | OUTPATIENT
Start: 2023-03-10 | End: 2024-02-06

## 2023-03-10 ASSESSMENT — ASTHMA QUESTIONNAIRES
QUESTION_4 LAST FOUR WEEKS HOW OFTEN HAVE YOU USED YOUR RESCUE INHALER OR NEBULIZER MEDICATION (SUCH AS ALBUTEROL): ONCE A WEEK OR LESS
QUESTION_2 LAST FOUR WEEKS HOW OFTEN HAVE YOU HAD SHORTNESS OF BREATH: ONCE OR TWICE A WEEK
ACT_TOTALSCORE: 22
EMERGENCY_ROOM_LAST_YEAR_TOTAL: TWO
QUESTION_5 LAST FOUR WEEKS HOW WOULD YOU RATE YOUR ASTHMA CONTROL: WELL CONTROLLED
QUESTION_1 LAST FOUR WEEKS HOW MUCH OF THE TIME DID YOUR ASTHMA KEEP YOU FROM GETTING AS MUCH DONE AT WORK, SCHOOL OR AT HOME: NONE OF THE TIME
ACT_TOTALSCORE: 22
QUESTION_3 LAST FOUR WEEKS HOW OFTEN DID YOUR ASTHMA SYMPTOMS (WHEEZING, COUGHING, SHORTNESS OF BREATH, CHEST TIGHTNESS OR PAIN) WAKE YOU UP AT NIGHT OR EARLIER THAN USUAL IN THE MORNING: NOT AT ALL

## 2023-03-10 ASSESSMENT — ANXIETY QUESTIONNAIRES
GAD7 TOTAL SCORE: 14
6. BECOMING EASILY ANNOYED OR IRRITABLE: SEVERAL DAYS
5. BEING SO RESTLESS THAT IT IS HARD TO SIT STILL: SEVERAL DAYS
7. FEELING AFRAID AS IF SOMETHING AWFUL MIGHT HAPPEN: NOT AT ALL
7. FEELING AFRAID AS IF SOMETHING AWFUL MIGHT HAPPEN: NOT AT ALL
IF YOU CHECKED OFF ANY PROBLEMS ON THIS QUESTIONNAIRE, HOW DIFFICULT HAVE THESE PROBLEMS MADE IT FOR YOU TO DO YOUR WORK, TAKE CARE OF THINGS AT HOME, OR GET ALONG WITH OTHER PEOPLE: VERY DIFFICULT
2. NOT BEING ABLE TO STOP OR CONTROL WORRYING: NEARLY EVERY DAY
GAD7 TOTAL SCORE: 14
GAD7 TOTAL SCORE: 14
1. FEELING NERVOUS, ANXIOUS, OR ON EDGE: NEARLY EVERY DAY
8. IF YOU CHECKED OFF ANY PROBLEMS, HOW DIFFICULT HAVE THESE MADE IT FOR YOU TO DO YOUR WORK, TAKE CARE OF THINGS AT HOME, OR GET ALONG WITH OTHER PEOPLE?: VERY DIFFICULT
3. WORRYING TOO MUCH ABOUT DIFFERENT THINGS: NEARLY EVERY DAY
4. TROUBLE RELAXING: NEARLY EVERY DAY

## 2023-03-10 ASSESSMENT — PATIENT HEALTH QUESTIONNAIRE - PHQ9
SUM OF ALL RESPONSES TO PHQ QUESTIONS 1-9: 3
10. IF YOU CHECKED OFF ANY PROBLEMS, HOW DIFFICULT HAVE THESE PROBLEMS MADE IT FOR YOU TO DO YOUR WORK, TAKE CARE OF THINGS AT HOME, OR GET ALONG WITH OTHER PEOPLE: SOMEWHAT DIFFICULT
SUM OF ALL RESPONSES TO PHQ QUESTIONS 1-9: 3

## 2023-03-10 ASSESSMENT — ENCOUNTER SYMPTOMS: NERVOUS/ANXIOUS: 1

## 2023-03-10 NOTE — PROGRESS NOTES
Abraham is a 37 year old who is being evaluated via a billable video visit.      How would you like to obtain your AVS? MyChart  If the video visit is dropped, the invitation should be resent by: Text to cell phone: [phone]  Will anyone else be joining your video visit? No        Assessment & Plan     1. Generalized anxiety disorder  - escitalopram (LEXAPRO) 10 MG tablet; Take 1 tablet (10 mg) by mouth daily  Dispense: 90 tablet; Refill: 3      Anxiety uncontrolled. Start with selective serotonin reuptake inhibitor. Will trial Lexapro 5mg daily for 1-2 weeks, then increase to full tablet. We can continue further titration if inadequate response. Encouraged to take with food.     Will consider referral to therapy as well. Offer at next appointment. I anticipate time being a barrier.       No follow-ups on file.   Follow-up Visit   Expected date:  Apr 21, 2023 (Approximate)      Follow Up Appointment Details:     Follow-up with whom?: PCP    Follow-Up for what?: Adult Preventive    Any Additional Chronic Condition Management?: Anxiety    How?: In Person                    Ny العراقي, [facility]   Abraham is a 37 year old presenting for the following health issues:  Anxiety      Anxiety    History of Present Illness       Mental Health Follow-up:  Patient presents to follow-up on Anxiety.    Patient's anxiety since last visit has been:  Worse  The patient is not having other symptoms associated with anxiety.  Any significant life events: other  Patient is feeling anxious or having panic attacks.  Patient has no concerns about alcohol or drug use.    He eats 2-3 servings of fruits and vegetables daily.He consumes 0 sweetened beverage(s) daily.He exercises with enough effort to increase his heart rate 9 or less minutes per day.  He exercises with enough effort to increase his heart rate 3 or less days per week.   He is taking medications regularly.    Today's PHQ-9          PHQ-9 Total Score: 3    PHQ-9 Q9 Thoughts of better off dead/self-harm past 2 weeks :   Not at all    How difficult have these problems made it for you to do your work, take care of things at home, or get along with other people: Somewhat difficult  Today's NAHOMI-7 Score: 14       PHQ 3/10/2023   PHQ-9 Total Score 3   Q9: Thoughts of better off dead/self-harm past 2 weeks Not at all     NAHOMI-7 SCORE 3/10/2023   Total Score 14 (moderate anxiety)   Total Score 14       ACT Total Scores 10/25/2022 11/23/2022 3/10/2023   ACT TOTAL SCORE (Goal Greater than or Equal to 20) 13 9 22   In the past 12 months, how many times did you visit the emergency room for your asthma without being admitted to the hospital? 1 1 2   In the past 12 months, how many times were you hospitalized overnight because of your asthma? 0 0 0     Wanting to connect about his anxiety issues. Trying to manage on own. 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 Not currently doing any counseling. Hasn't been on meds before.     No panic attacks.  Chest symptoms improved with omeprazole, using Tums for breakthrough symptoms.     Review of Systems   Psychiatric/Behavioral: The patient is nervous/anxious.       See HPI above for pertinent ROS.       Objective    Vitals - Patient Reported  Systolic (Patient Reported): 120  Diastolic (Patient Reported): 82  Weight (Patient Reported): 117.9 kg (260 lb)        Physical Exam   GENERAL: Healthy, alert and no distress  EYES: Eyes grossly normal to inspection.  No discharge or erythema, or obvious scleral/conjunctival abnormalities.  RESP: No audible wheeze, cough, or visible cyanosis.  No visible retractions or increased work of breathing.    SKIN: Visible skin clear. No significant rash, abnormal pigmentation or lesions.  NEURO: Cranial nerves grossly intact.  Mentation and speech appropriate for age.  PSYCH: Mentation appears normal, affect normal/bright, judgement and insight  intact, normal speech and appearance well-groomed.            Video-Visit Details    Type of service:  Video Visit   Video Start Time: 9:43 AM  Video End Time:9:52 AM    Originating Location (pt. Location): Home  Distant Location (provider location):  On-site  Platform used for Video Visit: Nalini

## 2023-04-23 ENCOUNTER — HEALTH MAINTENANCE LETTER (OUTPATIENT)
Age: 38
End: 2023-04-23

## 2023-05-10 ENCOUNTER — TRANSFERRED RECORDS (OUTPATIENT)
Dept: HEALTH INFORMATION MANAGEMENT | Facility: CLINIC | Age: 38
End: 2023-05-10
Payer: COMMERCIAL

## 2023-06-09 ENCOUNTER — OFFICE VISIT (OUTPATIENT)
Dept: ALLERGY | Facility: CLINIC | Age: 38
End: 2023-06-09
Attending: NURSE PRACTITIONER
Payer: COMMERCIAL

## 2023-06-09 VITALS — OXYGEN SATURATION: 100 % | WEIGHT: 267 LBS | BODY MASS INDEX: 36.16 KG/M2 | HEART RATE: 81 BPM | HEIGHT: 72 IN

## 2023-06-09 DIAGNOSIS — J30.1 SEASONAL ALLERGIC RHINITIS DUE TO POLLEN: Primary | ICD-10-CM

## 2023-06-09 DIAGNOSIS — J45.30 MILD PERSISTENT ASTHMA WITHOUT COMPLICATION: ICD-10-CM

## 2023-06-09 DIAGNOSIS — J30.81 ALLERGIC RHINITIS DUE TO ANIMALS: ICD-10-CM

## 2023-06-09 DIAGNOSIS — J30.89 ALLERGIC RHINITIS DUE TO DUST MITE: ICD-10-CM

## 2023-06-09 DIAGNOSIS — R09.82 ALLERGIC RHINITIS WITH POSTNASAL DRIP: ICD-10-CM

## 2023-06-09 DIAGNOSIS — J30.9 ALLERGIC RHINITIS WITH POSTNASAL DRIP: ICD-10-CM

## 2023-06-09 PROCEDURE — 99204 OFFICE O/P NEW MOD 45 MIN: CPT | Mod: 25 | Performed by: ALLERGY & IMMUNOLOGY

## 2023-06-09 PROCEDURE — 95004 PERQ TESTS W/ALRGNC XTRCS: CPT | Performed by: ALLERGY & IMMUNOLOGY

## 2023-06-09 RX ORDER — AZELASTINE 1 MG/ML
2 SPRAY, METERED NASAL 2 TIMES DAILY
Qty: 30 ML | Refills: 3 | Status: SHIPPED | OUTPATIENT
Start: 2023-06-09

## 2023-06-09 ASSESSMENT — ASTHMA QUESTIONNAIRES
EMERGENCY_ROOM_LAST_YEAR_TOTAL: TWO
ACT_TOTALSCORE: 17
QUESTION_5 LAST FOUR WEEKS HOW WOULD YOU RATE YOUR ASTHMA CONTROL: SOMEWHAT CONTROLLED
QUESTION_4 LAST FOUR WEEKS HOW OFTEN HAVE YOU USED YOUR RESCUE INHALER OR NEBULIZER MEDICATION (SUCH AS ALBUTEROL): ONE OR TWO TIMES PER DAY
QUESTION_2 LAST FOUR WEEKS HOW OFTEN HAVE YOU HAD SHORTNESS OF BREATH: ONCE A DAY
QUESTION_3 LAST FOUR WEEKS HOW OFTEN DID YOUR ASTHMA SYMPTOMS (WHEEZING, COUGHING, SHORTNESS OF BREATH, CHEST TIGHTNESS OR PAIN) WAKE YOU UP AT NIGHT OR EARLIER THAN USUAL IN THE MORNING: NOT AT ALL
ACT_TOTALSCORE: 17
QUESTION_1 LAST FOUR WEEKS HOW MUCH OF THE TIME DID YOUR ASTHMA KEEP YOU FROM GETTING AS MUCH DONE AT WORK, SCHOOL OR AT HOME: NONE OF THE TIME

## 2023-06-09 NOTE — LETTER
6/9/2023         RE: Duarte Cuellar  [address]  [place] [address]        Dear Colleague,    Thank you for referring your patient, Duarte Cuellar, to the [facility]. Please see a copy of my visit note below.          Subjective   Abraham is a 37 year old, presenting for the following health issues:  Allergy Consult (Allergies and asthma)    HPI   [id number]}  Chief complaint: Asthma and allergies    History of present illness: This is a 37-year-old gentleman that I was asked to see for evaluation by Paige De Anda in regards to allergies.  Patient states that he has had allergies and asthma for many years.  Patient states that he has tried several regimens for his allergies he continues to have breakthrough symptoms of itchy, watery eyes, sneezing, drainage.  He states he cannot breathe out of his nose.  He has a history of asthma.  Pulmonary function test were normal.  He does use Advair 230/21 2 puffs daily.  It was instructed to use 2 puffs twice daily but he states this morning his schedule for the basement to upstairs, he has had improved symptoms.  He does continue to use his albuterol inhaler at least once daily.  He states that he is currently using loratadine 20 mg daily as well as Singulair and has Atrovent nasal spray to use.  Flonase nasal spray previously was ineffective.  He was allergy tested via specific IgE and positive to dust mites cat dog.  He does have 3 dogs at home and 2 cats.  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  The cats and animals have free running of the home.  They do go in his bedroom.  He does have air purifier's.  He does not have dust mite covers for his bed.  He has required prednisone previously for his asthma.  He thinks last time was in November of last year.  He has not had allergy percutaneous testing.    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     Objective    Pulse 81   Ht 1.829 m (6')   Wt 121.1 kg (267 lb)   SpO2 100%   BMI 36.21 kg/m    Body mass index is 36.21 kg/m .  Physical Exam   Gen: Pleasant male not in acute distress  HEENT: Eyes no erythema of the bulbar or palpebral conjunctiva, no edema. Ears: No deformities or lesions Nose: No congestion, mucosa normal. Mouth: Throat clear, no lip or tongue edema.   Cardiac: Regular rate and rhythm, no murmurs, rubs or gallops  Respiratory: Clear to auscultation bilaterally, no adventitious breath sounds  Lymph: No visible supraclavicular or cervical lymphadenopathy  Skin: No rashes or lesions  Psych: Alert and oriented times 3      At today s visit the patient/parent and I engaged in an informed consent discussion about allergy testing.  We discussed skin testing, blood testing,  and the alternative of not undergoing any testing. The patient has a preference for skin testing. We then discussed the risks and benefits of skin testing.  The patient understands skin testing risks can include, but are not limited to, urticaria, angioedema, shortness of breath, and severe anaphylaxis.  The benefits include, but are not limited, to evaluation for allergens causing symptoms.  After answering the patients/parents questions they have agreed to proceed with skin testing.    30 percutaneous test were placed today environmental skin test panel.  Positive histamine control with a positive test to tree pollen, dust mites, grass pollen, weed pollen, cat and dog.  Please see scanned photograph.    Impression report and plan:  1.  Allergic rhinitis  2.  Mild persistent asthma    Increase Advair to 2 puffs  twice daily.  Add Astelin nasal spray.  Okay to continue Atrovent as needed.  He can take up to 4 times normal dosing of antihistamines so suggested 20 mg of cetirizine in the morning and 20 mg loratadine at night.  Reviewed environmental control.  Continue montelukast.  I stated next up would be to consider allergy shots.  He is a full-time parent at the time and states this would be easier in the fall.  I went over the risks and benefits of allergy shots.  I stated risks include hives, swelling, shortness of breath.  I did state that one in 2.5 million shot administrations can result in death.  I stated they must wait in the office for 30 minutes following the shot and carry an epinephrine device on the day of the shot.  I stated that shots are effective in about 90% of patients.  I stated that they should check with the insurance company prior to proceeding.  They understand the risks and benefits and agreed to proceed.  Patient or stands asthma must be well controlled to begin allergy shots.  He will let us know in the fall when he would like to begin.  Consent forms were signed and scanned to the record.                     Again, thank you for allowing me to participate in the care of your patient.        Sincerely,        Ophelia RAMIREZ MD

## 2023-06-09 NOTE — PATIENT INSTRUCTIONS
Spring, Summer, Fall    Air purifier/keep animals out of bedroom    Allergy shots    Increase Advair to 2 puffs twice daily     Astelin 2 sprays each nostril twice daily     Cetirizine 20 mg daily plus loratidine 10 mg daily     Check insurance

## 2023-06-09 NOTE — PROGRESS NOTES
Subjective   Abraham is a 37 year old, presenting for the following health issues:  Allergy Consult (Allergies and asthma)    HPI   [id number]}  Chief complaint: Asthma and allergies    History of present illness: This is a 37-year-old gentleman that I was asked to see for evaluation by Paige De Anda in regards to allergies.  Patient states that he has had allergies and asthma for many years.  Patient states that he has tried several regimens for his allergies he continues to have breakthrough symptoms of itchy, watery eyes, sneezing, drainage.  He states he cannot breathe out of his nose.  He has a history of asthma.  Pulmonary function test were normal.  He does use Advair 230/21 2 puffs daily.  It was instructed to use 2 puffs twice daily but he states this morning his schedule for the basement to upstairs, he has had improved symptoms.  He does continue to use his albuterol inhaler at least once daily.  He states that he is currently using loratadine 20 mg daily as well as Singulair and has Atrovent nasal spray to use.  Flonase nasal spray previously was ineffective.  He was allergy tested via specific IgE and positive to dust mites cat dog.  He does have 3 dogs at home and 2 cats.  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  The cats and animals have free running of the home.  They do go in his bedroom.  He does have air purifier's.  He does not have dust mite covers for his bed.  He has required prednisone previously for his asthma.  He thinks last time was in November of last year.  He has not had allergy percutaneous testing.    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      Objective    Pulse 81   Ht 1.829 m (6')   Wt 121.1 kg (267 lb)   SpO2 100%   BMI 36.21 kg/m    Body mass index is 36.21 kg/m .  Physical Exam   Gen: Pleasant male not in acute distress  HEENT: Eyes no erythema of the bulbar or palpebral conjunctiva, no edema. Ears: No deformities or lesions Nose: No congestion, mucosa normal. Mouth: Throat clear, no lip or tongue edema.   Cardiac: Regular rate and rhythm, no murmurs, rubs or gallops  Respiratory: Clear to auscultation bilaterally, no adventitious breath sounds  Lymph: No visible supraclavicular or cervical lymphadenopathy  Skin: No rashes or lesions  Psych: Alert and oriented times 3      At today s visit the patient/parent and I engaged in an informed consent discussion about allergy testing.  We discussed skin testing, blood testing,  and the alternative of not undergoing any testing. The patient has a preference for skin testing. We then discussed the risks and benefits of skin testing.  The patient understands skin testing risks can include, but are not limited to, urticaria, angioedema, shortness of breath, and severe anaphylaxis.  The benefits include, but are not limited, to evaluation for allergens causing symptoms.  After answering the patients/parents questions they have agreed to proceed with skin testing.    30 percutaneous test were placed today environmental skin test panel.  Positive histamine control with a positive test to tree pollen, dust mites, grass pollen, weed pollen, cat and dog.  Please see scanned photograph.    Impression report and plan:  1.  Allergic rhinitis  2.  Mild persistent asthma    Increase Advair to 2 puffs twice daily.  Add Astelin nasal spray.  Okay to continue Atrovent as needed.  He can take up to 4 times normal dosing of antihistamines so suggested 20 mg of cetirizine in the morning and 20 mg loratadine at night.  Reviewed environmental control.  Continue  montelukast.  I stated next up would be to consider allergy shots.  He is a full-time parent at the time and states this would be easier in the fall.  I went over the risks and benefits of allergy shots.  I stated risks include hives, swelling, shortness of breath.  I did state that one in 2.5 million shot administrations can result in death.  I stated they must wait in the office for 30 minutes following the shot and carry an epinephrine device on the day of the shot.  I stated that shots are effective in about 90% of patients.  I stated that they should check with the insurance company prior to proceeding.  They understand the risks and benefits and agreed to proceed.  Patient or stands asthma must be well controlled to begin allergy shots.  He will let us know in the fall when he would like to begin.  Consent forms were signed and scanned to the record.

## 2023-10-20 NOTE — PROGRESS NOTES
Subjective: feeling okay today, usually has a baseline 3-4/10 pain. Felt okay after last session.    Assessment:  Patient seen for 1st f/u session low back pain. He did well with Medx DE today, as well as initiation of rotary torso. Progressed HEP with core strengthening, cues to keep neutral spine. Patient likely will benefit from re-establishing routine to keep spine healthy, as he was doing well in past while doing this.     Lumbar MedX Initial testing    AROM (full=  0-72  lumbar) 0-42   Max Extension Torque  282   Flex: ext ratio (ideal 1.4:1) 2.01:1     Date 8/24/2022 8/22/22   Lumbar Parameters     Top Dead Center (TDC) 21 21   Counterbalance (CB) 410 410   Seat Pad 0 0   Femur Restraint 5 5   Week/Visit     Enter Week/Visit # Wk 1 V 2 W1V1   Weight (lbs) 127# 125# (ex)   Reps (#) 20 15   Time 112 s 90s   ROM (degrees) 0-42 0-42   Pain  slight   Flex:Ext ratio  2.01:1     Date 8/24/2022 8/22/22   Exercise     Treadmill  X 5 min X 5 min   Rotary Torso 90 seconds     Supine PPTs  X 10, 5 sec holds   Quadruped birddog  X 10 B   Lateral band walks  X 10 B green band   Single leg bridge X 10 with 5 second holds    Supine 9090 double leg march (reverse crunch) X 10 cues for neutral spine    Cat cow  X 5 3-5 second holds    maris pose X 5 rocking  3-5 second holds    Prone Extensions X 3 5 second holds                          Layo Leonard, PT    
yes...

## 2023-11-10 DIAGNOSIS — J45.40 MODERATE PERSISTENT ASTHMA WITHOUT COMPLICATION: ICD-10-CM

## 2023-11-10 RX ORDER — ALBUTEROL SULFATE 90 UG/1
AEROSOL, METERED RESPIRATORY (INHALATION)
Qty: 18 G | Refills: 2 | Status: SHIPPED | OUTPATIENT
Start: 2023-11-10 | End: 2023-11-13

## 2023-11-13 DIAGNOSIS — J45.40 MODERATE PERSISTENT ASTHMA WITHOUT COMPLICATION: ICD-10-CM

## 2023-11-13 RX ORDER — ALBUTEROL SULFATE 90 UG/1
AEROSOL, METERED RESPIRATORY (INHALATION)
Qty: 18 G | Refills: 2 | Status: SHIPPED | OUTPATIENT
Start: 2023-11-13

## 2023-12-21 DIAGNOSIS — J45.30 MILD PERSISTENT ASTHMA, UNSPECIFIED WHETHER COMPLICATED: ICD-10-CM

## 2023-12-21 RX ORDER — FLUTICASONE PROPIONATE AND SALMETEROL XINAFOATE 230; 21 UG/1; UG/1
AEROSOL, METERED RESPIRATORY (INHALATION)
Qty: 12 G | Refills: 0 | OUTPATIENT
Start: 2023-12-21

## 2024-02-06 ENCOUNTER — OFFICE VISIT (OUTPATIENT)
Dept: FAMILY MEDICINE | Facility: CLINIC | Age: 39
End: 2024-02-06
Payer: COMMERCIAL

## 2024-02-06 VITALS
HEIGHT: 71 IN | TEMPERATURE: 98.8 F | HEART RATE: 89 BPM | BODY MASS INDEX: 37.31 KG/M2 | SYSTOLIC BLOOD PRESSURE: 118 MMHG | DIASTOLIC BLOOD PRESSURE: 75 MMHG | WEIGHT: 266.5 LBS | OXYGEN SATURATION: 95 % | RESPIRATION RATE: 16 BRPM

## 2024-02-06 DIAGNOSIS — Z23 IMMUNIZATION DUE: ICD-10-CM

## 2024-02-06 DIAGNOSIS — B35.3 TINEA PEDIS OF BOTH FEET: ICD-10-CM

## 2024-02-06 DIAGNOSIS — Z13.220 LIPID SCREENING: ICD-10-CM

## 2024-02-06 DIAGNOSIS — M54.42 CHRONIC BILATERAL LOW BACK PAIN WITH BILATERAL SCIATICA: ICD-10-CM

## 2024-02-06 DIAGNOSIS — J45.30 MILD PERSISTENT ASTHMA, UNSPECIFIED WHETHER COMPLICATED: ICD-10-CM

## 2024-02-06 DIAGNOSIS — F90.2 ATTENTION DEFICIT HYPERACTIVITY DISORDER (ADHD), COMBINED TYPE: ICD-10-CM

## 2024-02-06 DIAGNOSIS — G89.4 CHRONIC PAIN DISORDER: ICD-10-CM

## 2024-02-06 DIAGNOSIS — L30.9 DERMATITIS: ICD-10-CM

## 2024-02-06 DIAGNOSIS — Z00.00 ANNUAL PHYSICAL EXAM: Primary | ICD-10-CM

## 2024-02-06 DIAGNOSIS — G89.29 CHRONIC BILATERAL LOW BACK PAIN WITH BILATERAL SCIATICA: ICD-10-CM

## 2024-02-06 DIAGNOSIS — K21.9 GASTROESOPHAGEAL REFLUX DISEASE WITHOUT ESOPHAGITIS: ICD-10-CM

## 2024-02-06 DIAGNOSIS — Z13.1 SCREENING FOR DIABETES MELLITUS: ICD-10-CM

## 2024-02-06 DIAGNOSIS — M54.41 CHRONIC BILATERAL LOW BACK PAIN WITH BILATERAL SCIATICA: ICD-10-CM

## 2024-02-06 DIAGNOSIS — E66.812 CLASS 2 OBESITY WITHOUT SERIOUS COMORBIDITY WITH BODY MASS INDEX (BMI) OF 37.0 TO 37.9 IN ADULT, UNSPECIFIED OBESITY TYPE: ICD-10-CM

## 2024-02-06 DIAGNOSIS — Z79.899 CONTROLLED SUBSTANCE AGREEMENT SIGNED: ICD-10-CM

## 2024-02-06 LAB — HBA1C MFR BLD: 5.1 % (ref 0–5.6)

## 2024-02-06 PROCEDURE — 83036 HEMOGLOBIN GLYCOSYLATED A1C: CPT | Performed by: FAMILY MEDICINE

## 2024-02-06 PROCEDURE — 99395 PREV VISIT EST AGE 18-39: CPT | Mod: 25 | Performed by: FAMILY MEDICINE

## 2024-02-06 PROCEDURE — 99214 OFFICE O/P EST MOD 30 MIN: CPT | Mod: 25 | Performed by: FAMILY MEDICINE

## 2024-02-06 PROCEDURE — 87340 HEPATITIS B SURFACE AG IA: CPT | Performed by: FAMILY MEDICINE

## 2024-02-06 PROCEDURE — 90715 TDAP VACCINE 7 YRS/> IM: CPT | Performed by: FAMILY MEDICINE

## 2024-02-06 PROCEDURE — 80061 LIPID PANEL: CPT | Performed by: FAMILY MEDICINE

## 2024-02-06 PROCEDURE — 90471 IMMUNIZATION ADMIN: CPT | Performed by: FAMILY MEDICINE

## 2024-02-06 PROCEDURE — 36415 COLL VENOUS BLD VENIPUNCTURE: CPT | Performed by: FAMILY MEDICINE

## 2024-02-06 PROCEDURE — 80048 BASIC METABOLIC PNL TOTAL CA: CPT | Performed by: FAMILY MEDICINE

## 2024-02-06 PROCEDURE — 86706 HEP B SURFACE ANTIBODY: CPT | Performed by: FAMILY MEDICINE

## 2024-02-06 RX ORDER — DEXTROAMPHETAMINE SACCHARATE, AMPHETAMINE ASPARTATE, DEXTROAMPHETAMINE SULFATE AND AMPHETAMINE SULFATE 3.75; 3.75; 3.75; 3.75 MG/1; MG/1; MG/1; MG/1
1 TABLET ORAL
COMMUNITY
Start: 2023-12-29 | End: 2024-02-06

## 2024-02-06 RX ORDER — TRIAMCINOLONE ACETONIDE 1 MG/G
CREAM TOPICAL 2 TIMES DAILY
Qty: 80 G | Refills: 1 | Status: SHIPPED | OUTPATIENT
Start: 2024-02-06

## 2024-02-06 RX ORDER — METHOCARBAMOL 750 MG/1
TABLET, FILM COATED ORAL
Qty: 60 TABLET | Refills: 1 | Status: SHIPPED | OUTPATIENT
Start: 2024-02-06

## 2024-02-06 RX ORDER — DEXTROAMPHETAMINE SACCHARATE, AMPHETAMINE ASPARTATE, DEXTROAMPHETAMINE SULFATE AND AMPHETAMINE SULFATE 3.75; 3.75; 3.75; 3.75 MG/1; MG/1; MG/1; MG/1
15 TABLET ORAL
Qty: 60 TABLET | Refills: 0 | Status: SHIPPED | OUTPATIENT
Start: 2024-02-06

## 2024-02-06 RX ORDER — KETOCONAZOLE 20 MG/G
CREAM TOPICAL DAILY
Qty: 60 G | Refills: 0 | Status: SHIPPED | OUTPATIENT
Start: 2024-02-06

## 2024-02-06 RX ORDER — MONTELUKAST SODIUM 10 MG/1
1 TABLET ORAL AT BEDTIME
Qty: 90 TABLET | Refills: 3 | Status: SHIPPED | OUTPATIENT
Start: 2024-02-06

## 2024-02-06 RX ORDER — FLUTICASONE PROPIONATE AND SALMETEROL XINAFOATE 230; 21 UG/1; UG/1
AEROSOL, METERED RESPIRATORY (INHALATION)
Qty: 36 G | Refills: 3 | Status: SHIPPED | OUTPATIENT
Start: 2024-02-06

## 2024-02-06 SDOH — HEALTH STABILITY: PHYSICAL HEALTH: ON AVERAGE, HOW MANY MINUTES DO YOU ENGAGE IN EXERCISE AT THIS LEVEL?: 30 MIN

## 2024-02-06 SDOH — HEALTH STABILITY: PHYSICAL HEALTH: ON AVERAGE, HOW MANY DAYS PER WEEK DO YOU ENGAGE IN MODERATE TO STRENUOUS EXERCISE (LIKE A BRISK WALK)?: 5 DAYS

## 2024-02-06 ASSESSMENT — ANXIETY QUESTIONNAIRES
8. IF YOU CHECKED OFF ANY PROBLEMS, HOW DIFFICULT HAVE THESE MADE IT FOR YOU TO DO YOUR WORK, TAKE CARE OF THINGS AT HOME, OR GET ALONG WITH OTHER PEOPLE?: NOT DIFFICULT AT ALL
6. BECOMING EASILY ANNOYED OR IRRITABLE: NOT AT ALL
GAD7 TOTAL SCORE: 0
IF YOU CHECKED OFF ANY PROBLEMS ON THIS QUESTIONNAIRE, HOW DIFFICULT HAVE THESE PROBLEMS MADE IT FOR YOU TO DO YOUR WORK, TAKE CARE OF THINGS AT HOME, OR GET ALONG WITH OTHER PEOPLE: NOT DIFFICULT AT ALL
3. WORRYING TOO MUCH ABOUT DIFFERENT THINGS: NOT AT ALL
7. FEELING AFRAID AS IF SOMETHING AWFUL MIGHT HAPPEN: NOT AT ALL
2. NOT BEING ABLE TO STOP OR CONTROL WORRYING: NOT AT ALL
GAD7 TOTAL SCORE: 0
GAD7 TOTAL SCORE: 0
1. FEELING NERVOUS, ANXIOUS, OR ON EDGE: NOT AT ALL
5. BEING SO RESTLESS THAT IT IS HARD TO SIT STILL: NOT AT ALL
7. FEELING AFRAID AS IF SOMETHING AWFUL MIGHT HAPPEN: NOT AT ALL
4. TROUBLE RELAXING: NOT AT ALL

## 2024-02-06 ASSESSMENT — SOCIAL DETERMINANTS OF HEALTH (SDOH): HOW OFTEN DO YOU GET TOGETHER WITH FRIENDS OR RELATIVES?: NEVER

## 2024-02-06 ASSESSMENT — PATIENT HEALTH QUESTIONNAIRE - PHQ9
SUM OF ALL RESPONSES TO PHQ QUESTIONS 1-9: 0
SUM OF ALL RESPONSES TO PHQ QUESTIONS 1-9: 0

## 2024-02-06 NOTE — PROGRESS NOTES
Preventive Care Visit  [facility]  Ny العراقي DO, Family Medicine  Feb 6, 2024      Assessment & Plan     1. Annual physical exam  - REVIEW OF HEALTH MAINTENANCE PROTOCOL ORDERS    Reviewed health history and health maintenance recommendations.    2. Mild persistent asthma, unspecified whether complicated  - fluticasone-salmeterol (ADVAIR HFA) 230-21 MCG/ACT inhaler; Inhale 2 puffs BY MOUTH twice a day.  Dispense: 36 g; Refill: 3  - montelukast (SINGULAIR) 10 MG tablet; Take 1 tablet (10 mg) by mouth at bedtime  Dispense: 90 tablet; Refill: 3    Refill sent to the pharmacy.  ACT adequately controlled.    3. Chronic pain disorder  4. Chronic bilateral low back pain with bilateral sciatica  - methocarbamol (ROBAXIN) 750 MG tablet; TAKE 1 TABLET  BY MOUTH ONCE A DAY AS NEEDED.  Dispense: 60 tablet; Refill: 1    Using muscle relaxer sparingly.  Refill sent to pharmacy.    5. Controlled substance agreement signed 2/6/24  6. Attention deficit hyperactivity disorder (ADHD), combined type  Renewed controlled substance agreement.  Due for refill.  Refill of Adderall sent to pharmacy.  Continue current treatment plan.    7. Class 2 obesity without serious comorbidity with body mass index (BMI) of 37.0 to 37.9 in adult, unspecified obesity type  - Basic metabolic panel  (Ca, Cl, CO2, Creat, Gluc, K, Na, BUN); Future  - Lipid panel reflex to direct LDL Non-fasting; Future    Keep working on healthy lifestyle modifications.    8. Gastroesophageal reflux disease without esophagitis  - omeprazole (PRILOSEC) 20 MG DR capsule; Take 1 capsule (20 mg) by mouth daily  Dispense: 90 capsule; Refill: 3    Symptoms much improved on omeprazole.  Symptoms rebound when he discontinues omeprazole.  Will continue for now, keep working on healthy lifestyle modifications to avoid dietary triggers.  If symptoms not adequately controlled or experiencing new symptoms, recommend further evaluation with EGD.    9.  "Tinea pedis of both feet  - ketoconazole (NIZORAL) 2 % external cream; Apply topically daily  Dispense: 60 g; Refill: 0     rash on foot consistent with tinea pedis.  Trial topical ketoconazole.    10. Dermatitis  - triamcinolone (KENALOG) 0.1 % external cream; Apply topically 2 times daily  Dispense: 80 g; Refill: 1    Rash on back consistent with dermatitis/eczema.  Trial triamcinolone.    11. Screening for diabetes mellitus  - Basic metabolic panel  (Ca, Cl, CO2, Creat, Gluc, K, Na, BUN); Future  - Hemoglobin A1c; Future    12. Lipid screening  - Lipid panel reflex to direct LDL Non-fasting; Future    13. Immunization due  - TDAP 10-64Y (ADACEL,BOOSTRIX)  - Hepatitis B Surface Antibody; Future        BMI  Estimated body mass index is 37.43 kg/m  as calculated from the following:    Height as of this encounter: 1.797 m (5' 10.75\").    Weight as of this encounter: 120.9 kg (266 lb 8 oz).   Weight management plan: Discussed healthy diet and exercise guidelines    Counseling  Appropriate preventive services were discussed with this patient, including applicable screening as appropriate for fall prevention, nutrition, physical activity, Tobacco-use cessation, weight loss and cognition.  Checklist reviewing preventive services available has been given to the patient.  Reviewed patient's diet, addressing concerns and/or questions.   Patient is at risk for social isolation and has been provided with information about the benefit of social connection.   The patient was instructed to see the dentist every 6 months.     Patient has been advised of split billing requirements and indicates understanding: Yes        Subjective   Abraham is a 38 year old, presenting for the following:  Physical        2/6/2024     3:24 PM   Additional Questions   Roomed by Corinne HEREDIA CMA   Accompanied by Self        Health Care Directive  Patient does not have a Health Care Directive or Living Will: Discussed advance care planning with patient; " information given to patient to review.    HPI    DRY SKIN: Rash on back and pinky toe. Using hydrocortisone, helps.       Annual physical exam  - hep B: will check antibodies    - covid: declines   - PCV: declines   - TDaP: will do   - Flu: declines      Mild persistent asthma, unspecified whether complicated      3/10/2023     9:36 AM 6/9/2023     7:03 AM 11/29/2023     7:09 AM   ACT Total Scores   ACT TOTAL SCORE (Goal Greater than or Equal to 20) 22 17 22   In the past 12 months, how many times did you visit the emergency room for your asthma without being admitted to the hospital? 2 2 0   In the past 12 months, how many times were you hospitalized overnight because of your asthma? 0 0 0     Triggers: allergies.      Albuterol PRN, Advair HFA (not taking), Singulair, Claritin, Azelastine nasal spray (not taking).     Chronic pain disorder  Chronic bilateral low back pain with bilateral sciatica  Methocarbamol. Using sparingly.       Attention deficit hyperactivity disorder (ADHD), combined type  Adderall 15mg daily. Has been doing well.     Class 2 obesity without serious comorbidity with body mass index (BMI) of 37.0 to 37.9 in adult, unspecified obesity type  Body mass index is 37.43 kg/m .  Wt Readings from Last 4 Encounters:   02/06/24 120.9 kg (266 lb 8 oz)   06/09/23 121.1 kg (267 lb)   02/24/23 122 kg (269 lb)   11/23/22 114.9 kg (253 lb 4.8 oz)         Gastroesophageal reflux disease without esophagitis  Omeprazole 20mg BID. Was dealing with left sided chest pains.    Saw pulmonology, lung scores were fine.   Symptoms improved with omeprazole.   Hasn't taken recently, doing Tums as neede.       Lexapro  Discontinued. Took the edge off, but didn't care as much.        3/10/2023     9:29 AM 2/6/2024     3:21 PM   PHQ   PHQ-9 Total Score 3 0   Q9: Thoughts of better off dead/self-harm past 2 weeks Not at all Not at all         3/10/2023     9:30 AM 2/6/2024     3:22 PM   NAHOMI-7 SCORE   Total Score 14  (moderate anxiety) 0 (minimal anxiety)   Total Score 14 0               2024   General Health   How would you rate your overall physical health? Good   Feel stress (tense, anxious, or unable to sleep) Not at all         2024   Nutrition   Three or more servings of calcium each day? Yes   Diet: Regular (no restrictions)   How many servings of fruit and vegetables per day? (!) 2-3   How many sweetened beverages each day? 0-1         2024   Exercise   Days per week of moderate/strenous exercise 5 days   Average minutes spent exercising at this level 30 min         2024   Social Factors   Frequency of gathering with friends or relatives Never   Worry food won't last until get money to buy more No   Food not last or not have enough money for food? No   Do you have housing?  Yes   Are you worried about losing your housing? No   Lack of transportation? No   Unable to get utilities (heat,electricity)? No   (!) SOCIAL CONNECTIONS CONCERN      2024   Dental   Dentist two times every year? (!) NO         2024   TB Screening   Were you born outside of [place]?  No       Today's PHQ-9 Score:       2024     3:21 PM   PHQ-9 SCORE   PHQ-9 Total Score MyChart 0   PHQ-9 Total Score 0         2024   Substance Use   Alcohol more than 3/day or more than 7/wk No   Do you use any other substances recreationally? No     Social History     Tobacco Use    Smoking status: Former     Packs/day: 4     Types: Cigarettes     Quit date: 2013     Years since quittin.1    Smokeless tobacco: Never   Vaping Use    Vaping Use: Former   Substance Use Topics    Alcohol use: Not Currently     Comment: a few times a week             2024   One time HIV Screening   Previous HIV test? Yes         2024   STI Screening   New sexual partner(s) since last STI/HIV test? No         2024   Contraception/Family Planning   Questions about contraception or family planning No        Reviewed and updated as needed this visit  "by Provider   Tobacco  Allergies  Meds  Problems  Med Hx  Surg Hx  Fam Hx              Review of Systems    Review of Systems  Constitutional, HEENT, cardiovascular, pulmonary, GI, , musculoskeletal, neuro, skin, endocrine and psych systems are negative, except as otherwise noted.     Objective    Exam  /75 (BP Location: Left arm, Patient Position: Sitting, Cuff Size: Adult Regular)   Pulse 89   Temp 98.8  F (37.1  C) (Oral)   Resp 16   Ht 1.797 m (5' 10.75\")   Wt 120.9 kg (266 lb 8 oz)   SpO2 95%   BMI 37.43 kg/m     Estimated body mass index is 37.43 kg/m  as calculated from the following:    Height as of this encounter: 1.797 m (5' 10.75\").    Weight as of this encounter: 120.9 kg (266 lb 8 oz).    Physical Exam  GENERAL: alert and no distress  EYES: Eyes grossly normal to inspection, PERRL and conjunctivae and sclerae normal  HENT: ear canals and TM's normal, nose and mouth without ulcers or lesions  NECK: no adenopathy, no asymmetry, masses, or scars  RESP: lungs clear to auscultation - no rales, rhonchi or wheezes  CV: regular rate and rhythm, normal S1 S2, no S3 or S4, no murmur, click or rub, no peripheral edema  ABDOMEN: soft, nontender, no hepatosplenomegaly, no masses and bowel sounds normal  MS: no gross musculoskeletal defects noted, no edema  SKIN: Rash on toe, scaly and itchy.  Erythematous patch on back as well, slightly excoriated.  NEURO: Normal strength and tone, mentation intact and speech normal  PSYCH: mentation appears normal, affect normal/bright        Signed Electronically by: Ny العراقي, DO    "

## 2024-02-06 NOTE — LETTER
[facility]  02/06/24  Patient: Duarte Cuellar  YOB: 1985  Medical Record Number: [id number]                                                                                  Non-Opioid Controlled Substance Agreement    This is an agreement between you and your provider regarding safe and appropriate use of controlled substances prescribed by your care team. Controlled substances are?medicines that can cause physical and mental dependence (abuse).     There are strict laws about having and using these medicines. We here at [facility] are  committed to working with you in your efforts to get better. To support you in this work, we'll help you schedule regular office appointments for medicine refills. If we must cancel or change your appointment for any reason, we'll make sure you have enough medicine to last until your next appointment.     As a Provider, I will:   Listen carefully to your concerns while treating you with respect.   Recommend a treatment plan that I believe is in your best interest and may involve therapies other than medicine.    Talk with you often about the possible benefits and the risk of harm of any medicine that we prescribe for you.  Assess the safety of this medicine and check how well it works.    Provide a plan on how to taper (discontinue or go off) using this medicine if the decision is made to stop its use.      ::  As a Patient, I understand controlled substances:     Are prescribed by my care provider to help me function or work and manage my condition(s).?  Are strong medicines and can cause serious side effects.     Need to be taken exactly as prescribed.?Combining controlled substances with certain medicines or chemicals (such as illegal drugs, alcohol, sedatives, sleeping pills, and benzodiazepines) can be dangerous or even fatal.? If I stop taking my medicines suddenly, I may have severe withdrawal symptoms.     The risks,  benefits, and side effects of these medicine(s) were explained to me. I agree that:    I will take part in other treatments as advised by my care team. This may be psychiatry or counseling, physical therapy, behavioral therapy, group treatment or a referral to specialist.    I will keep all my appointments and understand this is part of the monitoring of controlled substances.?My care team may require an office visit for EVERY controlled substance refill. If I miss appointments or don t follow instructions, my care team may stop my medicine    I will take my medicines as prescribed. I will not change the dose or schedule unless my care team tells me to. There will be no refills if I run out early.      I may be asked to come to the clinic and complete a urine drug test or complete a pill count. If I don t give a urine sample or participate in a pill count, the care team may stop my medicine.    I will only receive controlled substance prescriptions from this clinic. If I am treated by another provider, I will tell them that I am taking controlled substances and that I have a treatment agreement with this provider. I will inform my [facility] care team within one business day if I am given a prescription for any controlled substance by another healthcare provider. My [facility] care team can contact other providers and pharmacists about my use of any medicines.    It is up to me to make sure that I don't run out of my medicines on weekends or holidays.?If my care team is willing to refill my prescription without a visit, I must request refills only during office hours. Refills may take up to 3 business days to process. I will use one pharmacy to fill all my controlled substance prescriptions. I will notify the clinic about any changes to my insurance or medicine availability.    I am responsible for my prescriptions. If the medicine/prescription is lost, stolen or destroyed, it will not be replaced.?I  also agree not to share controlled substance medicines with anyone.     I am aware I should not use any illegal or recreational drugs. I agree not to drink alcohol unless my care team says I can.     If I enroll in the [place] [facility] program, I will tell my care team before my next refill.    I will tell my care team right away if I become pregnant, have a new medical problem treated outside of my regular clinic, or have a change in my medicines.     I understand that this medicine can affect my thinking, judgment and reaction time.? Alcohol and drugs affect the brain and body, which can affect the safety of my driving. Being under the influence of alcohol or drugs can affect my decision-making, behaviors, personal safety and the safety of others. Driving while impaired (DWI) can occur if a person is driving, operating or in physical control of a car, motorcycle, boat, snowmobile, ATV, motorbike, off-road vehicle or any other motor vehicle ([place] Statute 169A.20). I understand the risk if I choose to drive or operate any vehicle or machinery.    I understand that if I do not follow any of the conditions above, my prescriptions or treatment may be stopped or changed.   I agree that my provider, clinic care team and pharmacy may work with any city, state or federal law enforcement agency that investigates the misuse, sale or other diversion of my controlled medicine. I will allow my provider to discuss my care with, or share a copy of, this agreement with any other treating provider, pharmacy or emergency room where I receive care.     I have read this agreement and have asked questions about anything I did not understand.    ________________________________________________________  Patient Signature - Duarte Cuellar     ___________________                   Date     ________________________________________________________  Provider Signature - Ny العراقي, DO       ___________________                    Date     ________________________________________________________  Witness Signature (required if provider not present while patient signing)          ___________________                   Date

## 2024-02-07 LAB
ANION GAP SERPL CALCULATED.3IONS-SCNC: 11 MMOL/L (ref 7–15)
BUN SERPL-MCNC: 16 MG/DL (ref 6–20)
CALCIUM SERPL-MCNC: 10 MG/DL (ref 8.6–10)
CHLORIDE SERPL-SCNC: 102 MMOL/L (ref 98–107)
CHOLEST SERPL-MCNC: 198 MG/DL
CREAT SERPL-MCNC: 1.17 MG/DL (ref 0.67–1.17)
DEPRECATED HCO3 PLAS-SCNC: 26 MMOL/L (ref 22–29)
EGFRCR SERPLBLD CKD-EPI 2021: 82 ML/MIN/1.73M2
FASTING STATUS PATIENT QL REPORTED: NO
GLUCOSE SERPL-MCNC: 81 MG/DL (ref 70–99)
HBV SURFACE AB SERPL IA-ACNC: >1000 M[IU]/ML
HBV SURFACE AB SERPL IA-ACNC: REACTIVE M[IU]/ML
HDLC SERPL-MCNC: 63 MG/DL
LDLC SERPL CALC-MCNC: 105 MG/DL
NONHDLC SERPL-MCNC: 135 MG/DL
POTASSIUM SERPL-SCNC: 3.9 MMOL/L (ref 3.4–5.3)
SODIUM SERPL-SCNC: 139 MMOL/L (ref 135–145)
TRIGL SERPL-MCNC: 149 MG/DL

## 2024-02-08 ENCOUNTER — MYC REFILL (OUTPATIENT)
Dept: FAMILY MEDICINE | Facility: CLINIC | Age: 39
End: 2024-02-08
Payer: COMMERCIAL

## 2024-02-08 DIAGNOSIS — L70.0 ACNE VULGARIS: ICD-10-CM

## 2024-02-08 LAB — HBV SURFACE AG SERPL QL IA: NONREACTIVE

## 2024-02-08 RX ORDER — CLINDAMYCIN AND BENZOYL PEROXIDE 10; 50 MG/G; MG/G
GEL TOPICAL
Qty: 50 G | Refills: 0 | OUTPATIENT
Start: 2024-02-08

## 2024-02-08 NOTE — RESULT ENCOUNTER NOTE
The ASCVD Risk score (Bruce DK, et al., 2019) failed to calculate for the following reasons:    The 2019 ASCVD risk score is only valid for ages 40 to 79   Patient updated by [facility] message with lab results.      Naun Rosario,  Your labs have returned.  1.  LDL (bad cholesterol) a touch elevated.  Remaining cholesterol labs look great.  Keep working on a heart healthy and nutrient dense diet, regular physical activity, and weight management to help control cholesterol numbers.  2.  You are immune to hepatitis B.  You do not need the hepatitis B vaccine.  We will update the chart.  3.  Remaining labs look great.  Reach out by [facility] with follow-up questions or concerns.  Ny العراقي, DO

## 2024-04-17 ENCOUNTER — TRANSFERRED RECORDS (OUTPATIENT)
Dept: HEALTH INFORMATION MANAGEMENT | Facility: CLINIC | Age: 39
End: 2024-04-17
Payer: COMMERCIAL

## 2024-04-26 ENCOUNTER — TRANSFERRED RECORDS (OUTPATIENT)
Dept: HEALTH INFORMATION MANAGEMENT | Facility: CLINIC | Age: 39
End: 2024-04-26
Payer: COMMERCIAL

## 2024-07-02 ENCOUNTER — TRANSFERRED RECORDS (OUTPATIENT)
Dept: HEALTH INFORMATION MANAGEMENT | Facility: CLINIC | Age: 39
End: 2024-07-02
Payer: COMMERCIAL

## 2024-12-05 DIAGNOSIS — J45.40 MODERATE PERSISTENT ASTHMA WITHOUT COMPLICATION: ICD-10-CM

## 2024-12-05 RX ORDER — ALBUTEROL SULFATE 90 UG/1
INHALANT RESPIRATORY (INHALATION)
Qty: 18 G | Refills: 0 | Status: SHIPPED | OUTPATIENT
Start: 2024-12-05

## 2025-03-04 ENCOUNTER — MYC REFILL (OUTPATIENT)
Dept: FAMILY MEDICINE | Facility: CLINIC | Age: 40
End: 2025-03-04
Payer: COMMERCIAL

## 2025-03-04 DIAGNOSIS — J45.30 MILD PERSISTENT ASTHMA, UNSPECIFIED WHETHER COMPLICATED: ICD-10-CM

## 2025-03-06 DIAGNOSIS — M54.41 CHRONIC BILATERAL LOW BACK PAIN WITH BILATERAL SCIATICA: ICD-10-CM

## 2025-03-06 DIAGNOSIS — G89.4 CHRONIC PAIN DISORDER: ICD-10-CM

## 2025-03-06 DIAGNOSIS — M54.42 CHRONIC BILATERAL LOW BACK PAIN WITH BILATERAL SCIATICA: ICD-10-CM

## 2025-03-06 DIAGNOSIS — G89.29 CHRONIC BILATERAL LOW BACK PAIN WITH BILATERAL SCIATICA: ICD-10-CM

## 2025-03-06 RX ORDER — METHOCARBAMOL 750 MG/1
TABLET, FILM COATED ORAL
Qty: 60 TABLET | Refills: 0 | OUTPATIENT
Start: 2025-03-06

## 2025-03-08 ENCOUNTER — HEALTH MAINTENANCE LETTER (OUTPATIENT)
Age: 40
End: 2025-03-08

## 2025-03-19 RX ORDER — FLUTICASONE PROPIONATE AND SALMETEROL XINAFOATE 230; 21 UG/1; UG/1
AEROSOL, METERED RESPIRATORY (INHALATION)
Qty: 36 G | Refills: 0 | Status: SHIPPED | OUTPATIENT
Start: 2025-03-19

## 2025-05-06 ENCOUNTER — OFFICE VISIT (OUTPATIENT)
Dept: FAMILY MEDICINE | Facility: CLINIC | Age: 40
End: 2025-05-06
Payer: COMMERCIAL

## 2025-05-06 VITALS
DIASTOLIC BLOOD PRESSURE: 89 MMHG | RESPIRATION RATE: 16 BRPM | WEIGHT: 238 LBS | SYSTOLIC BLOOD PRESSURE: 134 MMHG | OXYGEN SATURATION: 96 % | HEIGHT: 71 IN | TEMPERATURE: 97.8 F | BODY MASS INDEX: 33.32 KG/M2 | HEART RATE: 77 BPM

## 2025-05-06 DIAGNOSIS — K21.9 GASTROESOPHAGEAL REFLUX DISEASE WITHOUT ESOPHAGITIS: ICD-10-CM

## 2025-05-06 DIAGNOSIS — M54.42 CHRONIC BILATERAL LOW BACK PAIN WITH BILATERAL SCIATICA: ICD-10-CM

## 2025-05-06 DIAGNOSIS — Z79.899 CONTROLLED SUBSTANCE AGREEMENT SIGNED: ICD-10-CM

## 2025-05-06 DIAGNOSIS — J45.40 MODERATE PERSISTENT ASTHMA WITHOUT COMPLICATION: ICD-10-CM

## 2025-05-06 DIAGNOSIS — Z00.00 ANNUAL PHYSICAL EXAM: Primary | ICD-10-CM

## 2025-05-06 DIAGNOSIS — F90.2 ATTENTION DEFICIT HYPERACTIVITY DISORDER (ADHD), COMBINED TYPE: ICD-10-CM

## 2025-05-06 DIAGNOSIS — G89.29 CHRONIC BILATERAL LOW BACK PAIN WITH BILATERAL SCIATICA: ICD-10-CM

## 2025-05-06 DIAGNOSIS — L70.0 ACNE VULGARIS: ICD-10-CM

## 2025-05-06 DIAGNOSIS — B35.3 TINEA PEDIS OF BOTH FEET: ICD-10-CM

## 2025-05-06 DIAGNOSIS — M54.41 CHRONIC BILATERAL LOW BACK PAIN WITH BILATERAL SCIATICA: ICD-10-CM

## 2025-05-06 PROCEDURE — G2211 COMPLEX E/M VISIT ADD ON: HCPCS | Performed by: FAMILY MEDICINE

## 2025-05-06 PROCEDURE — 99214 OFFICE O/P EST MOD 30 MIN: CPT | Mod: 25 | Performed by: FAMILY MEDICINE

## 2025-05-06 PROCEDURE — 96127 BRIEF EMOTIONAL/BEHAV ASSMT: CPT | Performed by: FAMILY MEDICINE

## 2025-05-06 PROCEDURE — 99395 PREV VISIT EST AGE 18-39: CPT | Performed by: FAMILY MEDICINE

## 2025-05-06 PROCEDURE — 3079F DIAST BP 80-89 MM HG: CPT | Performed by: FAMILY MEDICINE

## 2025-05-06 PROCEDURE — 3075F SYST BP GE 130 - 139MM HG: CPT | Performed by: FAMILY MEDICINE

## 2025-05-06 PROCEDURE — 80307 DRUG TEST PRSMV CHEM ANLYZR: CPT | Performed by: FAMILY MEDICINE

## 2025-05-06 RX ORDER — PREDNISONE 20 MG/1
40 TABLET ORAL DAILY
Qty: 10 TABLET | Refills: 0 | Status: SHIPPED | OUTPATIENT
Start: 2025-05-06 | End: 2025-05-11

## 2025-05-06 RX ORDER — KETOCONAZOLE 20 MG/G
CREAM TOPICAL DAILY
Qty: 60 G | Refills: 0 | Status: SHIPPED | OUTPATIENT
Start: 2025-05-06

## 2025-05-06 RX ORDER — CLINDAMYCIN AND BENZOYL PEROXIDE 10; 50 MG/G; MG/G
GEL TOPICAL
Qty: 50 G | Refills: 11 | Status: SHIPPED | OUTPATIENT
Start: 2025-05-06

## 2025-05-06 RX ORDER — OMEPRAZOLE 20 MG/1
20 CAPSULE, DELAYED RELEASE ORAL DAILY
Qty: 90 CAPSULE | Refills: 3 | Status: CANCELLED | OUTPATIENT
Start: 2025-05-06

## 2025-05-06 RX ORDER — ALBUTEROL SULFATE 90 UG/1
INHALANT RESPIRATORY (INHALATION)
Qty: 18 G | Refills: 2 | Status: SHIPPED | OUTPATIENT
Start: 2025-05-06

## 2025-05-06 RX ORDER — FLUTICASONE PROPIONATE AND SALMETEROL XINAFOATE 230; 21 UG/1; UG/1
AEROSOL, METERED RESPIRATORY (INHALATION)
Qty: 36 G | Refills: 4 | Status: SHIPPED | OUTPATIENT
Start: 2025-05-06

## 2025-05-06 RX ORDER — CLINDAMYCIN AND BENZOYL PEROXIDE 10; 50 MG/G; MG/G
GEL TOPICAL
Qty: 50 G | Refills: 0 | Status: SHIPPED | OUTPATIENT
Start: 2025-05-06 | End: 2025-05-06

## 2025-05-06 RX ORDER — MONTELUKAST SODIUM 10 MG/1
1 TABLET ORAL AT BEDTIME
Qty: 90 TABLET | Refills: 3 | Status: SHIPPED | OUTPATIENT
Start: 2025-05-06

## 2025-05-06 RX ORDER — DEXTROAMPHETAMINE SACCHARATE, AMPHETAMINE ASPARTATE, DEXTROAMPHETAMINE SULFATE AND AMPHETAMINE SULFATE 3.75; 3.75; 3.75; 3.75 MG/1; MG/1; MG/1; MG/1
15 TABLET ORAL
Qty: 60 TABLET | Refills: 0 | Status: SHIPPED | OUTPATIENT
Start: 2025-05-24

## 2025-05-06 RX ORDER — METHOCARBAMOL 750 MG/1
TABLET, FILM COATED ORAL
Qty: 60 TABLET | Refills: 1 | Status: SHIPPED | OUTPATIENT
Start: 2025-05-06

## 2025-05-06 SDOH — HEALTH STABILITY: PHYSICAL HEALTH: ON AVERAGE, HOW MANY DAYS PER WEEK DO YOU ENGAGE IN MODERATE TO STRENUOUS EXERCISE (LIKE A BRISK WALK)?: 4 DAYS

## 2025-05-06 SDOH — HEALTH STABILITY: PHYSICAL HEALTH: ON AVERAGE, HOW MANY MINUTES DO YOU ENGAGE IN EXERCISE AT THIS LEVEL?: 40 MIN

## 2025-05-06 ASSESSMENT — ANXIETY QUESTIONNAIRES
7. FEELING AFRAID AS IF SOMETHING AWFUL MIGHT HAPPEN: NOT AT ALL
5. BEING SO RESTLESS THAT IT IS HARD TO SIT STILL: NOT AT ALL
GAD7 TOTAL SCORE: 2
GAD7 TOTAL SCORE: 2
1. FEELING NERVOUS, ANXIOUS, OR ON EDGE: NOT AT ALL
3. WORRYING TOO MUCH ABOUT DIFFERENT THINGS: NOT AT ALL
6. BECOMING EASILY ANNOYED OR IRRITABLE: NOT AT ALL
2. NOT BEING ABLE TO STOP OR CONTROL WORRYING: NOT AT ALL

## 2025-05-06 ASSESSMENT — ASTHMA QUESTIONNAIRES
ACT_TOTALSCORE: 22
QUESTION_1 LAST FOUR WEEKS HOW MUCH OF THE TIME DID YOUR ASTHMA KEEP YOU FROM GETTING AS MUCH DONE AT WORK, SCHOOL OR AT HOME: NONE OF THE TIME
QUESTION_5 LAST FOUR WEEKS HOW WOULD YOU RATE YOUR ASTHMA CONTROL: WELL CONTROLLED
QUESTION_2 LAST FOUR WEEKS HOW OFTEN HAVE YOU HAD SHORTNESS OF BREATH: ONCE OR TWICE A WEEK
QUESTION_3 LAST FOUR WEEKS HOW OFTEN DID YOUR ASTHMA SYMPTOMS (WHEEZING, COUGHING, SHORTNESS OF BREATH, CHEST TIGHTNESS OR PAIN) WAKE YOU UP AT NIGHT OR EARLIER THAN USUAL IN THE MORNING: NOT AT ALL
QUESTION_4 LAST FOUR WEEKS HOW OFTEN HAVE YOU USED YOUR RESCUE INHALER OR NEBULIZER MEDICATION (SUCH AS ALBUTEROL): ONCE A WEEK OR LESS

## 2025-05-06 ASSESSMENT — SOCIAL DETERMINANTS OF HEALTH (SDOH): HOW OFTEN DO YOU GET TOGETHER WITH FRIENDS OR RELATIVES?: NEVER

## 2025-05-06 ASSESSMENT — PATIENT HEALTH QUESTIONNAIRE - PHQ9
5. POOR APPETITE OR OVEREATING: MORE THAN HALF THE DAYS
SUM OF ALL RESPONSES TO PHQ QUESTIONS 1-9: 1

## 2025-05-06 NOTE — LETTER
[facility]  05/06/25  Patient: Duarte Cuellar  YOB: 1985  Medical Record Number: [id number]                                                                                  Non-Opioid Controlled Substance Agreement    This is an agreement between you and your provider regarding safe and appropriate use of controlled substances prescribed by your care team. Controlled substances are?medicines that can cause physical and mental dependence (abuse).     There are strict laws about having and using these medicines. We here at [facility] are  committed to working with you in your efforts to get better. To support you in this work, we'll help you schedule regular office appointments for medicine refills. If we must cancel or change your appointment for any reason, we'll make sure you have enough medicine to last until your next appointment.     As a Provider, I will:   Listen carefully to your concerns while treating you with respect.   Recommend a treatment plan that I believe is in your best interest and may involve therapies other than medicine.    Talk with you often about the possible benefits and the risk of harm of any medicine that we prescribe for you.  Assess the safety of this medicine and check how well it works.    Provide a plan on how to taper (discontinue or go off) using this medicine if the decision is made to stop its use.      ::  As a Patient, I understand controlled substances:     Are prescribed by my care provider to help me function or work and manage my condition(s).?  Are strong medicines and can cause serious side effects.     Need to be taken exactly as prescribed.?Combining controlled substances with certain medicines or chemicals (such as illegal drugs, alcohol, sedatives, sleeping pills, and benzodiazepines) can be dangerous or even fatal.? If I stop taking my medicines suddenly, I may have severe withdrawal symptoms.     The risks,  benefits, and side effects of these medicine(s) were explained to me. I agree that:    I will take part in other treatments as advised by my care team. This may be psychiatry or counseling, physical therapy, behavioral therapy, group treatment or a referral to specialist.    I will keep all my appointments and understand this is part of the monitoring of controlled substances.?My care team may require an office visit for EVERY controlled substance refill. If I miss appointments or don t follow instructions, my care team may stop my medicine    I will take my medicines as prescribed. I will not change the dose or schedule unless my care team tells me to. There will be no refills if I run out early.      I may be asked to come to the clinic and complete a urine drug test or complete a pill count. If I don t give a urine sample or participate in a pill count, the care team may stop my medicine.    I will only receive controlled substance prescriptions from this clinic. If I am treated by another provider, I will tell them that I am taking controlled substances and that I have a treatment agreement with this provider. I will inform my [facility] care team within one business day if I am given a prescription for any controlled substance by another healthcare provider. My [facility] care team can contact other providers and pharmacists about my use of any medicines.    It is up to me to make sure that I don't run out of my medicines on weekends or holidays.?If my care team is willing to refill my prescription without a visit, I must request refills only during office hours. Refills may take up to 3 business days to process. I will use one pharmacy to fill all my controlled substance prescriptions. I will notify the clinic about any changes to my insurance or medicine availability.    I am responsible for my prescriptions. If the medicine/prescription is lost, stolen or destroyed, it will not be replaced.?I  also agree not to share controlled substance medicines with anyone.     I am aware I should not use any illegal or recreational drugs. I agree not to drink alcohol unless my care team says I can.     If I enroll in the [place] Medical Cannabis program, I will tell my care team before my next refill.    I will tell my care team right away if I become pregnant, have a new medical problem treated outside of my regular clinic, or have a change in my medicines.     I understand that this medicine can affect my thinking, judgment and reaction time.? Alcohol and drugs affect the brain and body, which can affect the safety of my driving. Being under the influence of alcohol or drugs can affect my decision-making, behaviors, personal safety and the safety of others. Driving while impaired (DWI) can occur if a person is driving, operating or in physical control of a car, motorcycle, boat, snowmobile, ATV, motorbike, off-road vehicle or any other motor vehicle ([place] Statute 169A.20). I understand the risk if I choose to drive or operate any vehicle or machinery.    I understand that if I do not follow any of the conditions above, my prescriptions or treatment may be stopped or changed.   I agree that my provider, clinic care team and pharmacy may work with any city, state or federal law enforcement agency that investigates the misuse, sale or other diversion of my controlled medicine. I will allow my provider to discuss my care with, or share a copy of, this agreement with any other treating provider, pharmacy or emergency room where I receive care.     I have read this agreement and have asked questions about anything I did not understand.    ________________________________________________________  Patient Signature - Duarte Cuellar     ___________________                   Date     ________________________________________________________  Provider Signature - Ny العراقي, DO       ___________________                    Date     ________________________________________________________  Witness Signature (required if provider not present while patient signing)          ___________________                   Date

## 2025-05-06 NOTE — PATIENT INSTRUCTIONS
Patient Education   Preventive Care Advice   This is general advice given by our system to help you stay healthy. However, your care team may have specific advice just for you. Please talk to your care team about your preventive care needs.  Nutrition  Eat 5 or more servings of fruits and vegetables each day.  Try wheat bread, brown rice and whole grain pasta (instead of white bread, rice, and pasta).  Get enough calcium and vitamin D. Check the label on foods and aim for 100% of the RDA (recommended daily allowance).  Lifestyle  Exercise at least 150 minutes each week  (30 minutes a day, 5 days a week).  Do muscle strengthening activities 2 days a week. These help control your weight and prevent disease.  No smoking.  Wear sunscreen to prevent skin cancer.  Have a dental exam and cleaning every 6 months.  Yearly exams  See your health care team every year to talk about:  Any changes in your health.  Any medicines your care team has prescribed.  Preventive care, family planning, and ways to prevent chronic diseases.  Shots (vaccines)   HPV shots (up to age 26), if you've never had them before.  Hepatitis B shots (up to age 59), if you've never had them before.  COVID-19 shot: Get this shot when it's due.  Flu shot: Get a flu shot every year.  Tetanus shot: Get a tetanus shot every 10 years.  Pneumococcal, hepatitis A, and RSV shots: Ask your care team if you need these based on your risk.  Shingles shot (for age 50 and up)  General health tests  Diabetes screening:  Starting at age 35, Get screened for diabetes at least every 3 years.  If you are younger than age 35, ask your care team if you should be screened for diabetes.  Cholesterol test: At age 39, start having a cholesterol test every 5 years, or more often if advised.  Bone density scan (DEXA): At age 50, ask your care team if you should have this scan for osteoporosis (brittle bones).  Hepatitis C: Get tested at least once in your life.  STIs (sexually  transmitted infections)  Before age 24: Ask your care team if you should be screened for STIs.  After age 24: Get screened for STIs if you're at risk. You are at risk for STIs (including HIV) if:  You are sexually active with more than one person.  You don't use condoms every time.  You or a partner was diagnosed with a sexually transmitted infection.  If you are at risk for HIV, ask about PrEP medicine to prevent HIV.  Get tested for HIV at least once in your life, whether you are at risk for HIV or not.  Cancer screening tests  Cervical cancer screening: If you have a cervix, begin getting regular cervical cancer screening tests starting at age 21.  Breast cancer scan (mammogram): If you've ever had breasts, begin having regular mammograms starting at age 40. This is a scan to check for breast cancer.  Colon cancer screening: It is important to start screening for colon cancer at age 45.  Have a colonoscopy test every 10 years (or more often if you're at risk) Or, ask your provider about stool tests like a FIT test every year or Cologuard test every 3 years.  To learn more about your testing options, visit:   .  For help making a decision, visit:   https://bit.ly/wh02401.  Prostate cancer screening test: If you have a prostate, ask your care team if a prostate cancer screening test (PSA) at age 55 is right for you.  Lung cancer screening: If you are a current or former smoker ages 50 to 80, ask your care team if ongoing lung cancer screenings are right for you.  For informational purposes only. Not to replace the advice of your health care provider. Copyright   2023 [facility]. All rights reserved. Clinically reviewed by the [facility] [place] [facility]. Warp 9 [id number] - REV 01/24.  Relationships for Good Health  Relationships are important for our health and happiness. Social isolation, loneliness and lack of support are bad for your health. Studies show that loneliness can harm health  and limit your life span as much as high blood pressure and smoking.   Take some time to reflect on your relationships. Then answer these questions:  Are there people in your life that cause you stress or drain your energy? What can you do to set limits?  ________________________________________________________________________________________________________________________________________________________________________________________________________________________________________________________________________________________________________________________________________________  Who do you enjoy spending time with? Who can you go to for support?  ________________________________________________________________________________________________________________________________________________________________________________________________________________________________________________________________________________________________________________________________________________  What can you do to improve your relationships with others?  __________________________________________________________________________________________________________________________________________________________________________________________________________________  ______________________________________________________________________________________________________________________________  What do you like most about your relationships with others?  ________________________________________________________________________________________________________________________________________________________________________________________________________________________________________________________________________________________________________________________________________________  My goal: ______________________________________________________________________  I will:  ______________________________________________________________________________________________________________________________________________________________________________________________    For informational purposes only. Not to replace the advice of your health care provider. Copyright   2018 [facility]. All rights reserved. Clinically reviewed by [facility]  Team. Olga [id number] - Rev 06/24.

## 2025-05-06 NOTE — PROGRESS NOTES
Preventive Care Visit  [facility]  Ny العراقي DO, Family Medicine  May 6, 2025      Assessment & Plan     1. Annual physical exam (Primary)  Reviewed health history and health maintenance recommendations.    Declines pneumonia or COVID vaccines today.  Congratulated on efforts for weight management.    2. Moderate persistent asthma without complication  - albuterol (PROAIR HFA/PROVENTIL HFA/VENTOLIN HFA) 108 (90 Base) MCG/ACT inhaler; INHALE 2 PUFFS BY MOUTH EVERY FOUR HOURS AS NEEDED  Dispense: 18 g; Refill: 2  - fluticasone-salmeterol (ADVAIR HFA) 230-21 MCG/ACT inhaler; Inhale 2 puffs BY MOUTH twice a day.  Dispense: 36 g; Refill: 4  - montelukast (SINGULAIR) 10 MG tablet; Take 1 tablet (10 mg) by mouth at bedtime.  Dispense: 90 tablet; Refill: 3  - predniSONE (DELTASONE) 20 MG tablet; Take 2 tablets (40 mg) by mouth daily for 5 days.  Dispense: 10 tablet; Refill: 0    ACT in range.  Continue scheduled Singulair and Advair, albuterol as needed.  Will prescribe prednisone to have on hand in event of asthma exacerbation.    3. Gastroesophageal reflux disease without esophagitis  Symptoms have been under better control with dietary modifications.  Off PPI.    4. Acne vulgaris  - clindamycin-benzoyl peroxide (BENZACLIN) 1-5 % external gel; APPLY TOPICALLY TO SKIN TWICE A DAY  Dispense: 50 g; Refill: 11    Symptoms responding well to the combo clindamycin BPO.  Continue current regimen    5. Controlled substance agreement signed 5/6/25  6. Attention deficit hyperactivity disorder (ADHD), combined type  - Urine Drug Screen; Future  - amphetamine-dextroamphetamine (ADDERALL) 15 MG tablet; Take 1 tablet (15 mg) by mouth 2 times daily.  Dispense: 60 tablet; Refill: 0    Update UDS, PHQ-9, NAHOMI-7.  Symptoms have been stable and under good control.  I will take over prescribing his Adderall.  Follow-up 6 months CSA med check.    7. Chronic bilateral low back pain with bilateral sciatica  -  "methocarbamol (ROBAXIN) 750 MG tablet; TAKE 1 TABLET  BY MOUTH ONCE A DAY AS NEEDED.  Dispense: 60 tablet; Refill: 1    Refill methocarbamol to have on hand for as needed flares of back pain.    8. Tinea pedis of both feet  - ketoconazole (NIZORAL) 2 % external cream; Apply topically daily.  Dispense: 60 g; Refill: 0      Ketoconazole refilled to have on hand in event of recurrent tinea pedis.    Patient has been advised of split billing requirements and indicates understanding: Yes        BMI  Estimated body mass index is 33.32 kg/m  as calculated from the following:    Height as of this encounter: 1.8 m (5' 10.87\").    Weight as of this encounter: 108 kg (238 lb).   Weight management plan: Discussed healthy diet and exercise guidelines    Counseling  Appropriate preventive services were addressed with this patient via screening, questionnaire, or discussion as appropriate for fall prevention, nutrition, physical activity, Tobacco-use cessation, social engagement, weight loss and cognition.  Checklist reviewing preventive services available has been given to the patient.  Reviewed patient's diet, addressing concerns and/or questions.   Patient is at risk for social isolation and has been provided with information about the benefit of social connection.   The patient was instructed to see the dentist every 6 months.       The longitudinal plan of care for the diagnosis(es)/condition(s) as documented were addressed during this visit. Due to the added complexity in care, I will continue to support Abraham in the subsequent management and with ongoing continuity of care.     Subjective   Abraham is a 39 year old, presenting for the following:  Physical        5/6/2025    11:09 AM   Additional Questions   Roomed by Justyn MACIEL    Annual physical exam (Primary)  - PCV: declines    - Covid: declines       Body mass index is 33.32 kg/m .  Wt Readings from Last 4 Encounters:   05/06/25 108 kg (238 lb)   02/06/24 120.9 " kg (266 lb 8 oz)   06/09/23 121.1 kg (267 lb)   02/24/23 122 kg (269 lb)     The ASCVD Risk score (Bruce DURHAM, et al., 2019) failed to calculate for the following reasons:    The 2019 ASCVD risk score is only valid for ages 40 to 79     Lab Results   Component Value Date    CHOL 198 02/06/2024     Lab Results   Component Value Date    HDL 63 02/06/2024     Lab Results   Component Value Date     02/06/2024     Lab Results   Component Value Date    TRIG 149 02/06/2024       Moderate persistent asthma without complication      11/29/2023     7:09 AM 10/7/2024     8:51 PM 5/6/2025    11:04 AM   ACT Total Scores   ACT TOTAL SCORE (Goal Greater than or Equal to 20) 22 22 22    In the past 12 months, how many times did you visit the emergency room for your asthma without being admitted to the hospital? 0 0 0   In the past 12 months, how many times were you hospitalized overnight because of your asthma? 0 0 0       Patient-reported     Advair, albuterol PRN, scheduled singulair.       Gastroesophageal reflux disease without esophagitis  Omeprazole 20mg daily, hasn't needed with dietary changes.      Acne vulgaris  Topical clinda/BPO        ADHD  Doing well with current dose adderall.         3/10/2023     9:29 AM 2/6/2024     3:21 PM 5/6/2025    11:46 AM   PHQ   PHQ-9 Total Score 3 0 1   Q9: Thoughts of better off dead/self-harm past 2 weeks Not at all  Not at all Not at all       Proxy-reported         3/10/2023     9:30 AM 2/6/2024     3:22 PM 5/6/2025    11:46 AM   NAHOMI-7 SCORE   Total Score 14 (moderate anxiety) 0 (minimal anxiety)    Total Score 14 0 2       Advance Care Planning    Discussed advance care planning with patient; informed AVS has link to [facility].        5/6/2025   General Health   How would you rate your overall physical health? (!) FAIR   Feel stress (tense, anxious, or unable to sleep) Only a little   (!) STRESS CONCERN        5/6/2025   Nutrition   Three or more servings of calcium each  day? Yes   Diet: Regular (no restrictions)   How many servings of fruit and vegetables per day? (!) 2-3   How many sweetened beverages each day? 0-1         2025   Exercise   Days per week of moderate/strenous exercise 4 days   Average minutes spent exercising at this level 40 min         2025   Social Factors   Frequency of gathering with friends or relatives Never   Worry food won't last until get money to buy more No   Food not last or not have enough money for food? No   Do you have housing? (Housing is defined as stable permanent housing and does not include staying outside in a car, in a tent, in an abandoned building, in an overnight shelter, or couch-surfing.) Yes   Are you worried about losing your housing? No   Lack of transportation? No   Unable to get utilities (heat,electricity)? No   (!) SOCIAL CONNECTIONS CONCERN      2025   Dental   Dentist two times every year? (!) NO         Today's PHQ-2 Score:       2025    11:03 AM   PHQ-2 (  [employer])   Q1: Little interest or pleasure in doing things 0   Q2: Feeling down, depressed or hopeless 0   PHQ-2 Score 0    Q1: Little interest or pleasure in doing things Not at all   Q2: Feeling down, depressed or hopeless Not at all   PHQ-2 Score 0       Patient-reported           2025   Substance Use   Alcohol more than 3/day or more than 7/wk No   Do you use any other substances recreationally? No     Social History     Tobacco Use    Smoking status: Former     Current packs/day: 0.00     Average packs/day: 2.0 packs/day for 5.0 years (10.0 ttl pk-yrs)     Types: Cigarettes     Quit date: 2013     Years since quittin.3    Smokeless tobacco: Never   Vaping Use    Vaping status: Former   Substance Use Topics    Alcohol use: Not Currently     Comment: a few times a week           2025   STI Screening   New sexual partner(s) since last STI/HIV test? No         2025   Contraception/Family Planning   Questions about contraception or family  "planning No        Reviewed and updated as needed this visit by Provider   Tobacco  Allergies  Meds  Problems  Med Hx  Surg Hx  Fam Hx            Review of Systems  Constitutional, HEENT, cardiovascular, pulmonary, GI, , musculoskeletal, neuro, skin, endocrine and psych systems are negative, except as otherwise noted.     Objective    Exam  /89   Pulse 77   Temp 97.8  F (36.6  C) (Oral)   Resp 16   Ht 1.8 m (5' 10.87\")   Wt 108 kg (238 lb)   SpO2 96%   BMI 33.32 kg/m     Estimated body mass index is 33.32 kg/m  as calculated from the following:    Height as of this encounter: 1.8 m (5' 10.87\").    Weight as of this encounter: 108 kg (238 lb).    Physical Exam  GENERAL: alert and no distress  EYES: Eyes grossly normal to inspection, PERRL and conjunctivae and sclerae normal  HENT: ear canals and TM's normal, nose and mouth without ulcers or lesions  NECK: no adenopathy, no asymmetry, masses, or scars  RESP: lungs clear to auscultation - no rales, rhonchi or wheezes  CV: regular rate and rhythm, no murmurs   ABDOMEN: soft, nontender, no hepatosplenomegaly, no masses and bowel sounds normal  MS: no gross musculoskeletal defects noted, no edema  SKIN: no suspicious lesions or rashes  NEURO: Normal strength and tone, mentation intact and speech normal  PSYCH: mentation appears normal, affect normal/bright        Signed Electronically by: Ny العراقي, DO    "

## 2025-05-07 ENCOUNTER — RESULTS FOLLOW-UP (OUTPATIENT)
Dept: FAMILY MEDICINE | Facility: CLINIC | Age: 40
End: 2025-05-07

## 2025-05-07 LAB
AMPHETAMINES UR QL SCN: ABNORMAL
BARBITURATES UR QL SCN: ABNORMAL
BENZODIAZ UR QL SCN: ABNORMAL
BZE UR QL SCN: ABNORMAL
CANNABINOIDS UR QL SCN: ABNORMAL
FENTANYL UR QL: ABNORMAL
OPIATES UR QL SCN: ABNORMAL
PCP QUAL URINE (ROCHE): ABNORMAL

## 2025-05-25 ENCOUNTER — MYC MEDICAL ADVICE (OUTPATIENT)
Dept: FAMILY MEDICINE | Facility: CLINIC | Age: 40
End: 2025-05-25
Payer: COMMERCIAL

## 2025-05-25 DIAGNOSIS — J45.50 SEVERE PERSISTENT ASTHMA WITHOUT COMPLICATION (H): Primary | ICD-10-CM

## 2025-05-27 NOTE — TELEPHONE ENCOUNTER
Attempted to call patient, left message for return call to clinic. Please transfer to a nurse for triage of current symptoms when he returns call. Thanks!    Stella Wilkes RN

## 2025-05-27 NOTE — TELEPHONE ENCOUNTER
Please triage patient. If concern for antibiotic need - would recommend in person visit for examination/vitals, maybe imaging/labs.  Did steroids help?  Is he using his current asthma treatment? How often is he using albuterol?  An inhaler (when used correctly) is as effective as a nebulizer. Does he feel it is helping? Does he have a spacer? Does he have any nebulized solution at home?  Ny العراقي, DO

## 2025-05-29 RX ORDER — PREDNISONE 20 MG/1
40 TABLET ORAL DAILY
Qty: 10 TABLET | Refills: 0 | Status: SHIPPED | OUTPATIENT
Start: 2025-05-29 | End: 2025-06-03

## 2025-05-29 NOTE — TELEPHONE ENCOUNTER
Severe persistent asthma without complication (H) (Primary)  - predniSONE (DELTASONE) 20 MG tablet; Take 2 tablets (40 mg) by mouth daily for 5 days. Start as needed for asthma exacerbation  Dispense: 10 tablet; Refill: 0     Prednisone refilled to have on hand in event of recurrent asthma exacerbation.    Ny العراقي, DO

## 2025-06-15 ENCOUNTER — MYC MEDICAL ADVICE (OUTPATIENT)
Dept: FAMILY MEDICINE | Facility: CLINIC | Age: 40
End: 2025-06-15
Payer: COMMERCIAL

## 2025-07-20 NOTE — TELEPHONE ENCOUNTER
1st attempt sent my chart message to pt to contact us to schedule an appt.  Please schedule physical + med check and route back to PCP.  
2nd attempt [facility] pt to contact us to schedule a phy + med check appt  
Abraham cardoso for physical plus med check.  Please help schedule appointment, route back to me once scheduled.  Ny العراقي, DO   
Mild persistent asthma, unspecified whether complicated  - fluticasone-salmeterol (ADVAIR HFA) 230-21 MCG/ACT inhaler; Inhale 2 puffs BY MOUTH twice a day.  Dispense: 36 g; Refill: 0     Ny العراقي DO   
Preventative scheduled 5/6/25 via [facility]  
Specialty Care (immediate)...

## 2025-07-28 ENCOUNTER — OFFICE VISIT (OUTPATIENT)
Dept: FAMILY MEDICINE | Facility: CLINIC | Age: 40
End: 2025-07-28
Payer: COMMERCIAL

## 2025-07-28 VITALS
DIASTOLIC BLOOD PRESSURE: 82 MMHG | WEIGHT: 246.6 LBS | TEMPERATURE: 98 F | OXYGEN SATURATION: 98 % | HEART RATE: 76 BPM | RESPIRATION RATE: 20 BRPM | BODY MASS INDEX: 34.52 KG/M2 | SYSTOLIC BLOOD PRESSURE: 128 MMHG

## 2025-07-28 DIAGNOSIS — R51.9 FRONTAL HEADACHE: ICD-10-CM

## 2025-07-28 DIAGNOSIS — J01.90 ACUTE SINUSITIS WITH SYMPTOMS > 10 DAYS: ICD-10-CM

## 2025-07-28 DIAGNOSIS — J02.9 ACUTE PHARYNGITIS, UNSPECIFIED ETIOLOGY: Primary | ICD-10-CM

## 2025-07-28 PROCEDURE — 3074F SYST BP LT 130 MM HG: CPT | Performed by: FAMILY MEDICINE

## 2025-07-28 PROCEDURE — 3079F DIAST BP 80-89 MM HG: CPT | Performed by: FAMILY MEDICINE

## 2025-07-28 PROCEDURE — 99213 OFFICE O/P EST LOW 20 MIN: CPT | Performed by: FAMILY MEDICINE

## 2025-07-28 RX ORDER — DOXYCYCLINE 100 MG/1
100 CAPSULE ORAL 2 TIMES DAILY
Qty: 20 CAPSULE | Refills: 0 | Status: SHIPPED | OUTPATIENT
Start: 2025-07-28 | End: 2025-08-07

## 2025-07-28 ASSESSMENT — ENCOUNTER SYMPTOMS: SORE THROAT: 1

## 2025-08-04 DIAGNOSIS — F90.2 ATTENTION DEFICIT HYPERACTIVITY DISORDER (ADHD), COMBINED TYPE: ICD-10-CM

## 2025-08-04 RX ORDER — DEXTROAMPHETAMINE SACCHARATE, AMPHETAMINE ASPARTATE, DEXTROAMPHETAMINE SULFATE AND AMPHETAMINE SULFATE 3.75; 3.75; 3.75; 3.75 MG/1; MG/1; MG/1; MG/1
15 TABLET ORAL
Qty: 60 TABLET | Refills: 0 | Status: SHIPPED | OUTPATIENT
Start: 2025-08-04